# Patient Record
Sex: FEMALE | Race: WHITE | NOT HISPANIC OR LATINO
[De-identification: names, ages, dates, MRNs, and addresses within clinical notes are randomized per-mention and may not be internally consistent; named-entity substitution may affect disease eponyms.]

---

## 2017-01-05 ENCOUNTER — APPOINTMENT (OUTPATIENT)
Dept: INTERNAL MEDICINE | Facility: CLINIC | Age: 52
End: 2017-01-05

## 2017-01-05 VITALS
HEART RATE: 57 BPM | HEIGHT: 67 IN | TEMPERATURE: 98.2 F | DIASTOLIC BLOOD PRESSURE: 70 MMHG | OXYGEN SATURATION: 98 % | WEIGHT: 156 LBS | RESPIRATION RATE: 14 BRPM | SYSTOLIC BLOOD PRESSURE: 110 MMHG | BODY MASS INDEX: 24.48 KG/M2

## 2017-01-08 ENCOUNTER — RX RENEWAL (OUTPATIENT)
Age: 52
End: 2017-01-08

## 2017-02-01 ENCOUNTER — RX RENEWAL (OUTPATIENT)
Age: 52
End: 2017-02-01

## 2017-02-06 ENCOUNTER — APPOINTMENT (OUTPATIENT)
Dept: NEUROLOGY | Facility: CLINIC | Age: 52
End: 2017-02-06

## 2017-02-06 VITALS
RESPIRATION RATE: 16 BRPM | HEART RATE: 72 BPM | DIASTOLIC BLOOD PRESSURE: 70 MMHG | WEIGHT: 160 LBS | BODY MASS INDEX: 25.11 KG/M2 | HEIGHT: 67 IN | SYSTOLIC BLOOD PRESSURE: 120 MMHG

## 2017-02-06 DIAGNOSIS — Z80.51 FAMILY HISTORY OF MALIGNANT NEOPLASM OF KIDNEY: ICD-10-CM

## 2017-02-15 ENCOUNTER — APPOINTMENT (OUTPATIENT)
Dept: NEUROLOGY | Facility: CLINIC | Age: 52
End: 2017-02-15

## 2017-03-07 ENCOUNTER — RX RENEWAL (OUTPATIENT)
Age: 52
End: 2017-03-07

## 2017-03-07 ENCOUNTER — APPOINTMENT (OUTPATIENT)
Dept: NEUROLOGY | Facility: CLINIC | Age: 52
End: 2017-03-07

## 2017-03-07 VITALS
HEIGHT: 67 IN | RESPIRATION RATE: 16 BRPM | SYSTOLIC BLOOD PRESSURE: 120 MMHG | BODY MASS INDEX: 25.11 KG/M2 | HEART RATE: 70 BPM | WEIGHT: 160 LBS | DIASTOLIC BLOOD PRESSURE: 70 MMHG

## 2017-03-07 DIAGNOSIS — M54.2 CERVICALGIA: ICD-10-CM

## 2017-03-07 DIAGNOSIS — G56.03 CARPAL TUNNEL SYNDROM,BILATERAL UPPER LIMBS: ICD-10-CM

## 2017-03-07 DIAGNOSIS — M54.12 RADICULOPATHY, CERVICAL REGION: ICD-10-CM

## 2017-03-07 DIAGNOSIS — G89.29 CERVICALGIA: ICD-10-CM

## 2017-03-07 RX ORDER — CEFUROXIME AXETIL 500 MG/1
500 TABLET ORAL
Qty: 20 | Refills: 0 | Status: COMPLETED | COMMUNITY
Start: 2016-10-20

## 2017-04-06 ENCOUNTER — RX RENEWAL (OUTPATIENT)
Age: 52
End: 2017-04-06

## 2017-04-12 ENCOUNTER — APPOINTMENT (OUTPATIENT)
Dept: INTERNAL MEDICINE | Facility: CLINIC | Age: 52
End: 2017-04-12

## 2017-04-21 ENCOUNTER — APPOINTMENT (OUTPATIENT)
Dept: INTERNAL MEDICINE | Facility: CLINIC | Age: 52
End: 2017-04-21

## 2017-04-21 VITALS
SYSTOLIC BLOOD PRESSURE: 110 MMHG | HEIGHT: 67 IN | OXYGEN SATURATION: 98 % | RESPIRATION RATE: 15 BRPM | HEART RATE: 66 BPM | DIASTOLIC BLOOD PRESSURE: 70 MMHG

## 2017-04-21 DIAGNOSIS — S61.419A LACERATION W/OUT FOREIGN BODY OF UNSPECIFIED HAND, INITIAL ENCOUNTER: ICD-10-CM

## 2017-04-26 ENCOUNTER — RX RENEWAL (OUTPATIENT)
Age: 52
End: 2017-04-26

## 2017-05-08 ENCOUNTER — RX RENEWAL (OUTPATIENT)
Age: 52
End: 2017-05-08

## 2017-06-12 ENCOUNTER — RX RENEWAL (OUTPATIENT)
Age: 52
End: 2017-06-12

## 2017-07-13 ENCOUNTER — RX RENEWAL (OUTPATIENT)
Age: 52
End: 2017-07-13

## 2017-08-21 ENCOUNTER — APPOINTMENT (OUTPATIENT)
Dept: INTERNAL MEDICINE | Facility: CLINIC | Age: 52
End: 2017-08-21

## 2017-08-28 ENCOUNTER — RX RENEWAL (OUTPATIENT)
Age: 52
End: 2017-08-28

## 2017-09-12 ENCOUNTER — APPOINTMENT (OUTPATIENT)
Dept: NEUROLOGY | Facility: CLINIC | Age: 52
End: 2017-09-12

## 2017-09-14 ENCOUNTER — APPOINTMENT (OUTPATIENT)
Dept: INTERNAL MEDICINE | Facility: CLINIC | Age: 52
End: 2017-09-14
Payer: MEDICAID

## 2017-09-14 VITALS
HEIGHT: 67 IN | WEIGHT: 159 LBS | BODY MASS INDEX: 24.96 KG/M2 | RESPIRATION RATE: 14 BRPM | TEMPERATURE: 98.4 F | DIASTOLIC BLOOD PRESSURE: 80 MMHG | OXYGEN SATURATION: 98 % | HEART RATE: 67 BPM | SYSTOLIC BLOOD PRESSURE: 110 MMHG

## 2017-09-14 PROCEDURE — 99214 OFFICE O/P EST MOD 30 MIN: CPT

## 2017-09-18 ENCOUNTER — RX RENEWAL (OUTPATIENT)
Age: 52
End: 2017-09-18

## 2017-09-25 ENCOUNTER — RX RENEWAL (OUTPATIENT)
Age: 52
End: 2017-09-25

## 2017-10-02 ENCOUNTER — RX RENEWAL (OUTPATIENT)
Age: 52
End: 2017-10-02

## 2017-10-30 ENCOUNTER — APPOINTMENT (OUTPATIENT)
Dept: INTERNAL MEDICINE | Facility: CLINIC | Age: 52
End: 2017-10-30
Payer: MEDICAID

## 2017-10-30 VITALS
BODY MASS INDEX: 23.54 KG/M2 | TEMPERATURE: 97.8 F | HEIGHT: 67 IN | RESPIRATION RATE: 14 BRPM | DIASTOLIC BLOOD PRESSURE: 84 MMHG | SYSTOLIC BLOOD PRESSURE: 120 MMHG | HEART RATE: 79 BPM | WEIGHT: 150 LBS | OXYGEN SATURATION: 94 %

## 2017-10-30 DIAGNOSIS — R06.2 WHEEZING: ICD-10-CM

## 2017-10-30 PROCEDURE — 99214 OFFICE O/P EST MOD 30 MIN: CPT

## 2017-10-30 RX ORDER — METHYLPREDNISOLONE 4 MG/1
4 TABLET ORAL
Qty: 21 | Refills: 0 | Status: DISCONTINUED | COMMUNITY
Start: 2017-07-12

## 2017-10-30 RX ORDER — AMOXICILLIN 500 MG/1
500 CAPSULE ORAL 3 TIMES DAILY
Qty: 30 | Refills: 0 | Status: DISCONTINUED | COMMUNITY
Start: 2017-01-05 | End: 2017-10-30

## 2017-10-30 RX ORDER — AMOXICILLIN AND CLAVULANATE POTASSIUM 500; 125 MG/1; MG/1
500-125 TABLET, FILM COATED ORAL
Qty: 10 | Refills: 0 | Status: DISCONTINUED | COMMUNITY
Start: 2016-09-23 | End: 2017-10-30

## 2017-10-30 RX ORDER — AMOXICILLIN 500 MG/1
500 TABLET, FILM COATED ORAL 3 TIMES DAILY
Qty: 30 | Refills: 0 | Status: DISCONTINUED | COMMUNITY
Start: 2017-09-14 | End: 2017-10-30

## 2017-11-06 ENCOUNTER — RX RENEWAL (OUTPATIENT)
Age: 52
End: 2017-11-06

## 2017-12-26 ENCOUNTER — RX RENEWAL (OUTPATIENT)
Age: 52
End: 2017-12-26

## 2018-01-23 ENCOUNTER — RX RENEWAL (OUTPATIENT)
Age: 53
End: 2018-01-23

## 2018-02-21 ENCOUNTER — APPOINTMENT (OUTPATIENT)
Dept: INTERNAL MEDICINE | Facility: CLINIC | Age: 53
End: 2018-02-21

## 2018-02-26 ENCOUNTER — APPOINTMENT (OUTPATIENT)
Dept: INTERNAL MEDICINE | Facility: CLINIC | Age: 53
End: 2018-02-26
Payer: MEDICAID

## 2018-02-26 VITALS
BODY MASS INDEX: 26.06 KG/M2 | WEIGHT: 166 LBS | OXYGEN SATURATION: 98 % | HEIGHT: 67 IN | HEART RATE: 82 BPM | RESPIRATION RATE: 14 BRPM | DIASTOLIC BLOOD PRESSURE: 76 MMHG | TEMPERATURE: 98.7 F | SYSTOLIC BLOOD PRESSURE: 118 MMHG

## 2018-02-26 PROCEDURE — 99214 OFFICE O/P EST MOD 30 MIN: CPT

## 2018-02-26 RX ORDER — METHYLPREDNISOLONE 4 MG/1
4 TABLET ORAL
Qty: 1 | Refills: 0 | Status: DISCONTINUED | COMMUNITY
Start: 2017-10-30 | End: 2018-02-26

## 2018-02-26 RX ORDER — AMOXICILLIN 500 MG/1
500 CAPSULE ORAL 3 TIMES DAILY
Qty: 30 | Refills: 0 | Status: DISCONTINUED | COMMUNITY
Start: 2017-10-30 | End: 2018-02-26

## 2018-03-09 DIAGNOSIS — S99.911A UNSPECIFIED INJURY OF RIGHT ANKLE, INITIAL ENCOUNTER: ICD-10-CM

## 2018-05-01 ENCOUNTER — RX RENEWAL (OUTPATIENT)
Age: 53
End: 2018-05-01

## 2018-05-15 ENCOUNTER — RX RENEWAL (OUTPATIENT)
Age: 53
End: 2018-05-15

## 2018-05-28 ENCOUNTER — RX RENEWAL (OUTPATIENT)
Age: 53
End: 2018-05-28

## 2018-06-14 ENCOUNTER — RX RENEWAL (OUTPATIENT)
Age: 53
End: 2018-06-14

## 2018-06-29 ENCOUNTER — RX RENEWAL (OUTPATIENT)
Age: 53
End: 2018-06-29

## 2018-08-03 ENCOUNTER — APPOINTMENT (OUTPATIENT)
Dept: INTERNAL MEDICINE | Facility: CLINIC | Age: 53
End: 2018-08-03
Payer: MEDICAID

## 2018-08-03 VITALS
HEIGHT: 67 IN | SYSTOLIC BLOOD PRESSURE: 130 MMHG | HEART RATE: 67 BPM | OXYGEN SATURATION: 95 % | WEIGHT: 160 LBS | DIASTOLIC BLOOD PRESSURE: 82 MMHG | BODY MASS INDEX: 25.11 KG/M2 | RESPIRATION RATE: 14 BRPM

## 2018-08-03 DIAGNOSIS — N39.0 URINARY TRACT INFECTION, SITE NOT SPECIFIED: ICD-10-CM

## 2018-08-03 LAB
BILIRUB UR QL STRIP: NORMAL
CLARITY UR: CLEAR
GLUCOSE UR-MCNC: NORMAL
HCG UR QL: 0.2 EU/DL
HGB UR QL STRIP.AUTO: NORMAL
KETONES UR-MCNC: NORMAL
LEUKOCYTE ESTERASE UR QL STRIP: NORMAL
NITRITE UR QL STRIP: NORMAL
PH UR STRIP: 7
PROT UR STRIP-MCNC: NORMAL
SP GR UR STRIP: 1.02

## 2018-08-03 PROCEDURE — 81003 URINALYSIS AUTO W/O SCOPE: CPT | Mod: QW

## 2018-08-03 PROCEDURE — 99214 OFFICE O/P EST MOD 30 MIN: CPT

## 2018-08-03 NOTE — HISTORY OF PRESENT ILLNESS
[FreeTextEntry8] : Ill for 2 weeks has cough chest congestion head congestion no fever or chills\par also complaining of urinary frequency

## 2018-08-08 ENCOUNTER — MEDICATION RENEWAL (OUTPATIENT)
Age: 53
End: 2018-08-08

## 2018-08-08 LAB — BACTERIA UR CULT: ABNORMAL

## 2018-10-05 ENCOUNTER — APPOINTMENT (OUTPATIENT)
Dept: INTERNAL MEDICINE | Facility: CLINIC | Age: 53
End: 2018-10-05
Payer: MEDICAID

## 2018-10-05 VITALS
RESPIRATION RATE: 14 BRPM | SYSTOLIC BLOOD PRESSURE: 128 MMHG | TEMPERATURE: 98.6 F | DIASTOLIC BLOOD PRESSURE: 82 MMHG | HEART RATE: 68 BPM | OXYGEN SATURATION: 97 % | HEIGHT: 67 IN | BODY MASS INDEX: 26.21 KG/M2 | WEIGHT: 167 LBS

## 2018-10-05 DIAGNOSIS — G47.00 INSOMNIA, UNSPECIFIED: ICD-10-CM

## 2018-10-05 PROCEDURE — 99214 OFFICE O/P EST MOD 30 MIN: CPT

## 2018-10-05 RX ORDER — BENZONATATE 100 MG/1
100 CAPSULE ORAL 3 TIMES DAILY
Qty: 30 | Refills: 0 | Status: DISCONTINUED | COMMUNITY
Start: 2017-10-30 | End: 2018-10-05

## 2018-10-05 RX ORDER — AMOXICILLIN 500 MG/1
500 CAPSULE ORAL 3 TIMES DAILY
Qty: 30 | Refills: 0 | Status: DISCONTINUED | COMMUNITY
Start: 2018-02-26 | End: 2018-10-05

## 2018-10-05 RX ORDER — NITROFURANTOIN (MONOHYDRATE/MACROCRYSTALS) 25; 75 MG/1; MG/1
100 CAPSULE ORAL TWICE DAILY
Qty: 14 | Refills: 0 | Status: DISCONTINUED | COMMUNITY
Start: 2018-08-08 | End: 2018-10-05

## 2018-10-05 RX ORDER — CEPHALEXIN 500 MG/1
500 CAPSULE ORAL
Qty: 15 | Refills: 0 | Status: DISCONTINUED | COMMUNITY
Start: 2018-08-31

## 2018-10-05 RX ORDER — BENZONATATE 100 MG/1
100 CAPSULE ORAL 3 TIMES DAILY
Qty: 30 | Refills: 0 | Status: DISCONTINUED | COMMUNITY
Start: 2017-09-14 | End: 2018-10-05

## 2018-10-25 ENCOUNTER — RX RENEWAL (OUTPATIENT)
Age: 53
End: 2018-10-25

## 2018-10-29 ENCOUNTER — APPOINTMENT (OUTPATIENT)
Dept: INTERNAL MEDICINE | Facility: CLINIC | Age: 53
End: 2018-10-29

## 2018-11-26 ENCOUNTER — APPOINTMENT (OUTPATIENT)
Dept: INTERNAL MEDICINE | Facility: CLINIC | Age: 53
End: 2018-11-26

## 2019-02-24 ENCOUNTER — RX RENEWAL (OUTPATIENT)
Age: 54
End: 2019-02-24

## 2019-04-01 ENCOUNTER — RX RENEWAL (OUTPATIENT)
Age: 54
End: 2019-04-01

## 2019-04-08 ENCOUNTER — APPOINTMENT (OUTPATIENT)
Dept: INTERNAL MEDICINE | Facility: CLINIC | Age: 54
End: 2019-04-08
Payer: MEDICAID

## 2019-04-08 ENCOUNTER — LABORATORY RESULT (OUTPATIENT)
Age: 54
End: 2019-04-08

## 2019-04-08 VITALS
WEIGHT: 165 LBS | SYSTOLIC BLOOD PRESSURE: 130 MMHG | TEMPERATURE: 98.6 F | HEART RATE: 70 BPM | RESPIRATION RATE: 14 BRPM | OXYGEN SATURATION: 98 % | DIASTOLIC BLOOD PRESSURE: 80 MMHG | BODY MASS INDEX: 25.84 KG/M2

## 2019-04-08 PROCEDURE — 99214 OFFICE O/P EST MOD 30 MIN: CPT

## 2019-04-08 RX ORDER — CLOTRIMAZOLE AND BETAMETHASONE DIPROPIONATE 10; .5 MG/ML; MG/ML
1-0.05 LOTION TOPICAL
Qty: 30 | Refills: 0 | Status: DISCONTINUED | COMMUNITY
Start: 2018-09-07 | End: 2019-04-08

## 2019-04-08 RX ORDER — AMOXICILLIN 500 MG/1
500 TABLET, FILM COATED ORAL 3 TIMES DAILY
Qty: 30 | Refills: 0 | Status: DISCONTINUED | COMMUNITY
Start: 2018-10-05 | End: 2019-04-08

## 2019-04-08 RX ORDER — ALBUTEROL SULFATE 90 UG/1
108 (90 BASE) AEROSOL, METERED RESPIRATORY (INHALATION) 4 TIMES DAILY
Qty: 1 | Refills: 0 | Status: DISCONTINUED | COMMUNITY
Start: 2018-02-26 | End: 2019-04-08

## 2019-04-08 RX ORDER — METHYLPREDNISOLONE 4 MG/1
4 TABLET ORAL
Qty: 1 | Refills: 0 | Status: DISCONTINUED | COMMUNITY
Start: 2018-10-05 | End: 2019-04-08
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
## 2019-04-17 LAB
25(OH)D3 SERPL-MCNC: 12.2 NG/ML
A ALTERNATA IGE QN: <0.1 KUA/L
A FUMIGATUS IGE QN: <0.1 KUA/L
ALBUMIN SERPL ELPH-MCNC: 5 G/DL
ALP BLD-CCNC: 75 U/L
ALT SERPL-CCNC: 28 U/L
ANION GAP SERPL CALC-SCNC: 14 MMOL/L
APPEARANCE: CLEAR
AST SERPL-CCNC: 26 U/L
BASOPHILS # BLD AUTO: 0.02 K/UL
BASOPHILS NFR BLD AUTO: 0.5 %
BERMUDA GRASS IGE QN: <0.1 KUA/L
BILIRUB SERPL-MCNC: 0.3 MG/DL
BILIRUBIN URINE: NEGATIVE
BLOOD URINE: NEGATIVE
BOXELDER IGE QN: <0.1 KUA/L
BUN SERPL-MCNC: 17 MG/DL
C HERBARUM IGE QN: <0.1 KUA/L
CALCIUM SERPL-MCNC: 10.3 MG/DL
CALIF WALNUT IGE QN: <0.1 KUA/L
CAT DANDER IGE QN: <0.1 KUA/L
CHLORIDE SERPL-SCNC: 102 MMOL/L
CHOLEST SERPL-MCNC: 316 MG/DL
CHOLEST/HDLC SERPL: 7 RATIO
CMN PIGWEED IGE QN: <0.1 KUA/L
CO2 SERPL-SCNC: 26 MMOL/L
COLOR: YELLOW
COMMON RAGWEED IGE QN: <0.1 KUA/L
COTTONWOOD IGE QN: <0.1 KUA/L
CREAT SERPL-MCNC: 0.75 MG/DL
D FARINAE IGE QN: 0.11 KUA/L
D PTERONYSS IGE QN: 0.12 KUA/L
DEPRECATED A ALTERNATA IGE RAST QL: 0
DEPRECATED A FUMIGATUS IGE RAST QL: 0
DEPRECATED BERMUDA GRASS IGE RAST QL: 0
DEPRECATED BOXELDER IGE RAST QL: 0
DEPRECATED C HERBARUM IGE RAST QL: 0
DEPRECATED CAT DANDER IGE RAST QL: 0
DEPRECATED COMMON PIGWEED IGE RAST QL: 0
DEPRECATED COMMON RAGWEED IGE RAST QL: 0
DEPRECATED COTTONWOOD IGE RAST QL: 0
DEPRECATED D FARINAE IGE RAST QL: NORMAL
DEPRECATED D PTERONYSS IGE RAST QL: NORMAL
DEPRECATED DOG DANDER IGE RAST QL: 0
DEPRECATED GOOSEFOOT IGE RAST QL: 0
DEPRECATED LONDON PLANE IGE RAST QL: 0
DEPRECATED MUGWORT IGE RAST QL: 0
DEPRECATED P NOTATUM IGE RAST QL: 0
DEPRECATED RED CEDAR IGE RAST QL: 0
DEPRECATED ROACH IGE RAST QL: 0
DEPRECATED SHEEP SORREL IGE RAST QL: 0
DEPRECATED SILVER BIRCH IGE RAST QL: 0
DEPRECATED TIMOTHY IGE RAST QL: 0
DEPRECATED WHITE ASH IGE RAST QL: 0
DEPRECATED WHITE OAK IGE RAST QL: 0
DOG DANDER IGE QN: <0.1 KUA/L
EOSINOPHIL # BLD AUTO: 0.13 K/UL
EOSINOPHIL NFR BLD AUTO: 3.5 %
ESTIMATED AVERAGE GLUCOSE: 117 MG/DL
FOLATE SERPL-MCNC: 15.8 NG/ML
GLUCOSE QUALITATIVE U: NEGATIVE
GLUCOSE SERPL-MCNC: 92 MG/DL
GOOSEFOOT IGE QN: <0.1 KUA/L
HBA1C MFR BLD HPLC: 5.7 %
HCT VFR BLD CALC: 41.5 %
HDLC SERPL-MCNC: 45 MG/DL
HGB BLD-MCNC: 13.2 G/DL
IMM GRANULOCYTES NFR BLD AUTO: 0.3 %
KETONES URINE: NEGATIVE
LDLC SERPL CALC-MCNC: 226 MG/DL
LEUKOCYTE ESTERASE URINE: NEGATIVE
LONDON PLANE IGE QN: <0.1 KUA/L
LYMPHOCYTES # BLD AUTO: 0.78 K/UL
LYMPHOCYTES NFR BLD AUTO: 21.1 %
MAN DIFF?: NORMAL
MCHC RBC-ENTMCNC: 28.9 PG
MCHC RBC-ENTMCNC: 31.8 GM/DL
MCV RBC AUTO: 91 FL
MONOCYTES # BLD AUTO: 0.38 K/UL
MONOCYTES NFR BLD AUTO: 10.3 %
MUGWORT IGE QN: <0.1 KUA/L
MULBERRY (T70) CLASS: 0
MULBERRY (T70) CONC: <0.1 KUA/L
NEUTROPHILS # BLD AUTO: 2.38 K/UL
NEUTROPHILS NFR BLD AUTO: 64.3 %
NITRITE URINE: NEGATIVE
P NOTATUM IGE QN: <0.1 KUA/L
PH URINE: 8
PLATELET # BLD AUTO: 186 K/UL
POTASSIUM SERPL-SCNC: 4.4 MMOL/L
PROT SERPL-MCNC: 7.9 G/DL
PROTEIN URINE: NORMAL
RBC # BLD: 4.56 M/UL
RBC # FLD: 14 %
RED CEDAR IGE QN: <0.1 KUA/L
ROACH IGE QN: <0.1 KUA/L
SHEEP SORREL IGE QN: <0.1 KUA/L
SILVER BIRCH IGE QN: <0.1 KUA/L
SODIUM SERPL-SCNC: 142 MMOL/L
SPECIFIC GRAVITY URINE: 1.03
TIMOTHY IGE QN: <0.1 KUA/L
TREE ALLERG MIX1 IGE QL: 0
TRIGL SERPL-MCNC: 225 MG/DL
TSH SERPL-ACNC: 1.16 UIU/ML
UROBILINOGEN URINE: NORMAL
VIT B12 SERPL-MCNC: 263 PG/ML
WBC # FLD AUTO: 3.7 K/UL
WHITE ASH IGE QN: <0.1 KUA/L
WHITE ELM IGE QN: 0
WHITE ELM IGE QN: <0.1 KUA/L
WHITE OAK IGE QN: <0.1 KUA/L

## 2019-05-02 ENCOUNTER — RX RENEWAL (OUTPATIENT)
Age: 54
End: 2019-05-02

## 2019-05-07 ENCOUNTER — APPOINTMENT (OUTPATIENT)
Dept: INTERNAL MEDICINE | Facility: CLINIC | Age: 54
End: 2019-05-07
Payer: MEDICAID

## 2019-05-07 VITALS
WEIGHT: 158 LBS | TEMPERATURE: 98.2 F | HEIGHT: 67 IN | DIASTOLIC BLOOD PRESSURE: 80 MMHG | HEART RATE: 89 BPM | RESPIRATION RATE: 14 BRPM | OXYGEN SATURATION: 98 % | SYSTOLIC BLOOD PRESSURE: 110 MMHG | BODY MASS INDEX: 24.8 KG/M2

## 2019-05-07 DIAGNOSIS — Z87.09 PERSONAL HISTORY OF OTHER DISEASES OF THE RESPIRATORY SYSTEM: ICD-10-CM

## 2019-05-07 DIAGNOSIS — J06.9 ACUTE UPPER RESPIRATORY INFECTION, UNSPECIFIED: ICD-10-CM

## 2019-05-07 LAB — S PYO AG SPEC QL IA: NEGATIVE

## 2019-05-07 PROCEDURE — 36415 COLL VENOUS BLD VENIPUNCTURE: CPT

## 2019-05-07 PROCEDURE — 99214 OFFICE O/P EST MOD 30 MIN: CPT | Mod: 25

## 2019-05-07 PROCEDURE — 87880 STREP A ASSAY W/OPTIC: CPT | Mod: QW

## 2019-05-07 NOTE — PHYSICAL EXAM
[No Acute Distress] : no acute distress [Well Nourished] : well nourished [Well Developed] : well developed [Well-Appearing] : well-appearing [Normal Sclera/Conjunctiva] : normal sclera/conjunctiva [PERRL] : pupils equal round and reactive to light [EOMI] : extraocular movements intact [Normal Outer Ear/Nose] : the outer ears and nose were normal in appearance [No JVD] : no jugular venous distention [Normal Oropharynx] : the oropharynx was normal [No Lymphadenopathy] : no lymphadenopathy [Thyroid Normal, No Nodules] : the thyroid was normal and there were no nodules present [Supple] : supple [No Respiratory Distress] : no respiratory distress  [Clear to Auscultation] : lungs were clear to auscultation bilaterally [No Accessory Muscle Use] : no accessory muscle use [Regular Rhythm] : with a regular rhythm [Normal Rate] : normal rate  [No Carotid Bruits] : no carotid bruits [Normal S1, S2] : normal S1 and S2 [No Murmur] : no murmur heard [No Varicosities] : no varicosities [No Abdominal Bruit] : a ~M bruit was not heard ~T in the abdomen [No Edema] : there was no peripheral edema [No Extremity Clubbing/Cyanosis] : no extremity clubbing/cyanosis [Pedal Pulses Present] : the pedal pulses are present [No Palpable Aorta] : no palpable aorta [Soft] : abdomen soft [Non Tender] : non-tender [No HSM] : no HSM [Non-distended] : non-distended [No Masses] : no abdominal mass palpated [Normal Bowel Sounds] : normal bowel sounds [Normal Posterior Cervical Nodes] : no posterior cervical lymphadenopathy [No Spinal Tenderness] : no spinal tenderness [Normal Anterior Cervical Nodes] : no anterior cervical lymphadenopathy [No CVA Tenderness] : no CVA  tenderness [No Joint Swelling] : no joint swelling [No Rash] : no rash [Grossly Normal Strength/Tone] : grossly normal strength/tone [Normal Gait] : normal gait [Deep Tendon Reflexes (DTR)] : deep tendon reflexes were 2+ and symmetric [Coordination Grossly Intact] : coordination grossly intact [Normal Affect] : the affect was normal [No Focal Deficits] : no focal deficits [de-identified] : mild submental tenderness. No discrete nodes appreciated. [Normal Insight/Judgement] : insight and judgment were intact

## 2019-05-08 ENCOUNTER — CLINICAL ADVICE (OUTPATIENT)
Age: 54
End: 2019-05-08

## 2019-05-08 LAB
ALBUMIN SERPL ELPH-MCNC: 4.9 G/DL
ALP BLD-CCNC: 77 U/L
ALT SERPL-CCNC: 21 U/L
ANION GAP SERPL CALC-SCNC: 14 MMOL/L
AST SERPL-CCNC: 22 U/L
BASOPHILS # BLD AUTO: 0.02 K/UL
BASOPHILS NFR BLD AUTO: 0.6 %
BILIRUB SERPL-MCNC: 0.4 MG/DL
BUN SERPL-MCNC: 23 MG/DL
CALCIUM SERPL-MCNC: 10.3 MG/DL
CHLORIDE SERPL-SCNC: 103 MMOL/L
CHOLEST SERPL-MCNC: 329 MG/DL
CHOLEST/HDLC SERPL: 6.2 RATIO
CO2 SERPL-SCNC: 24 MMOL/L
CREAT SERPL-MCNC: 0.77 MG/DL
EOSINOPHIL # BLD AUTO: 0.1 K/UL
EOSINOPHIL NFR BLD AUTO: 2.9 %
GLUCOSE SERPL-MCNC: 97 MG/DL
HCT VFR BLD CALC: 42.6 %
HDLC SERPL-MCNC: 53 MG/DL
HGB BLD-MCNC: 13.5 G/DL
IMM GRANULOCYTES NFR BLD AUTO: 0.3 %
LDLC SERPL CALC-MCNC: 244 MG/DL
LYMPHOCYTES # BLD AUTO: 0.7 K/UL
LYMPHOCYTES NFR BLD AUTO: 20.3 %
MAN DIFF?: NORMAL
MCHC RBC-ENTMCNC: 28.8 PG
MCHC RBC-ENTMCNC: 31.7 GM/DL
MCV RBC AUTO: 91 FL
MONOCYTES # BLD AUTO: 0.35 K/UL
MONOCYTES NFR BLD AUTO: 10.1 %
NEUTROPHILS # BLD AUTO: 2.27 K/UL
NEUTROPHILS NFR BLD AUTO: 65.8 %
PLATELET # BLD AUTO: 190 K/UL
POTASSIUM SERPL-SCNC: 4.5 MMOL/L
PROT SERPL-MCNC: 7.7 G/DL
RBC # BLD: 4.68 M/UL
RBC # FLD: 14.3 %
SODIUM SERPL-SCNC: 141 MMOL/L
TRIGL SERPL-MCNC: 160 MG/DL
WBC # FLD AUTO: 3.45 K/UL

## 2019-05-09 LAB — BACTERIA THROAT CULT: NORMAL

## 2019-06-23 ENCOUNTER — RX RENEWAL (OUTPATIENT)
Age: 54
End: 2019-06-23

## 2019-09-05 ENCOUNTER — APPOINTMENT (OUTPATIENT)
Dept: INTERNAL MEDICINE | Facility: CLINIC | Age: 54
End: 2019-09-05
Payer: MEDICAID

## 2019-09-05 VITALS
DIASTOLIC BLOOD PRESSURE: 74 MMHG | OXYGEN SATURATION: 98 % | TEMPERATURE: 98.4 F | RESPIRATION RATE: 14 BRPM | HEART RATE: 72 BPM | SYSTOLIC BLOOD PRESSURE: 126 MMHG | HEIGHT: 67 IN

## 2019-09-05 PROCEDURE — 99214 OFFICE O/P EST MOD 30 MIN: CPT

## 2019-09-05 RX ORDER — AMOXICILLIN 500 MG/1
500 CAPSULE ORAL 3 TIMES DAILY
Qty: 21 | Refills: 0 | Status: DISCONTINUED | COMMUNITY
Start: 2019-05-07 | End: 2019-09-05

## 2019-09-20 ENCOUNTER — APPOINTMENT (OUTPATIENT)
Dept: INTERNAL MEDICINE | Facility: CLINIC | Age: 54
End: 2019-09-20
Payer: MEDICAID

## 2019-09-20 VITALS
HEART RATE: 82 BPM | SYSTOLIC BLOOD PRESSURE: 130 MMHG | RESPIRATION RATE: 14 BRPM | DIASTOLIC BLOOD PRESSURE: 82 MMHG | OXYGEN SATURATION: 96 % | BODY MASS INDEX: 24.43 KG/M2 | WEIGHT: 156 LBS | TEMPERATURE: 99.2 F

## 2019-09-20 PROCEDURE — 99214 OFFICE O/P EST MOD 30 MIN: CPT

## 2019-09-20 NOTE — PHYSICAL EXAM
[No Acute Distress] : no acute distress [Well Nourished] : well nourished [Well Developed] : well developed [Well-Appearing] : well-appearing [Normal Sclera/Conjunctiva] : normal sclera/conjunctiva [PERRL] : pupils equal round and reactive to light [EOMI] : extraocular movements intact [Normal Oropharynx] : the oropharynx was normal [Normal Outer Ear/Nose] : the outer ears and nose were normal in appearance [No JVD] : no jugular venous distention [No Lymphadenopathy] : no lymphadenopathy [Supple] : supple [Thyroid Normal, No Nodules] : the thyroid was normal and there were no nodules present [No Respiratory Distress] : no respiratory distress  [No Accessory Muscle Use] : no accessory muscle use [Clear to Auscultation] : lungs were clear to auscultation bilaterally [Normal Rate] : normal rate  [Regular Rhythm] : with a regular rhythm [Normal S1, S2] : normal S1 and S2 [No Murmur] : no murmur heard [No Carotid Bruits] : no carotid bruits [No Abdominal Bruit] : a ~M bruit was not heard ~T in the abdomen [No Edema] : there was no peripheral edema [Pedal Pulses Present] : the pedal pulses are present [No Varicosities] : no varicosities [No Extremity Clubbing/Cyanosis] : no extremity clubbing/cyanosis [No Palpable Aorta] : no palpable aorta [Soft] : abdomen soft [Non Tender] : non-tender [Non-distended] : non-distended [No Masses] : no abdominal mass palpated [No HSM] : no HSM [Normal Bowel Sounds] : normal bowel sounds [Normal Posterior Cervical Nodes] : no posterior cervical lymphadenopathy [No CVA Tenderness] : no CVA  tenderness [Normal Anterior Cervical Nodes] : no anterior cervical lymphadenopathy [No Joint Swelling] : no joint swelling [No Spinal Tenderness] : no spinal tenderness [No Rash] : no rash [Grossly Normal Strength/Tone] : grossly normal strength/tone [No Focal Deficits] : no focal deficits [Coordination Grossly Intact] : coordination grossly intact [Deep Tendon Reflexes (DTR)] : deep tendon reflexes were 2+ and symmetric [Normal Gait] : normal gait [Normal Insight/Judgement] : insight and judgment were intact [Normal Affect] : the affect was normal

## 2019-11-01 ENCOUNTER — APPOINTMENT (OUTPATIENT)
Dept: INTERNAL MEDICINE | Facility: CLINIC | Age: 54
End: 2019-11-01
Payer: MEDICAID

## 2019-11-01 VITALS
SYSTOLIC BLOOD PRESSURE: 112 MMHG | HEART RATE: 89 BPM | DIASTOLIC BLOOD PRESSURE: 70 MMHG | OXYGEN SATURATION: 98 % | BODY MASS INDEX: 25.06 KG/M2 | TEMPERATURE: 98.7 F | WEIGHT: 160 LBS | RESPIRATION RATE: 14 BRPM

## 2019-11-01 DIAGNOSIS — Z87.09 PERSONAL HISTORY OF OTHER DISEASES OF THE RESPIRATORY SYSTEM: ICD-10-CM

## 2019-11-01 DIAGNOSIS — M79.672 PAIN IN LEFT FOOT: ICD-10-CM

## 2019-11-01 PROCEDURE — 99214 OFFICE O/P EST MOD 30 MIN: CPT

## 2019-11-01 NOTE — PHYSICAL EXAM
[No Acute Distress] : no acute distress [Well Nourished] : well nourished [Well Developed] : well developed [Well-Appearing] : well-appearing [Normal Sclera/Conjunctiva] : normal sclera/conjunctiva [PERRL] : pupils equal round and reactive to light [EOMI] : extraocular movements intact [Normal Outer Ear/Nose] : the outer ears and nose were normal in appearance [Normal Oropharynx] : the oropharynx was normal [No JVD] : no jugular venous distention [No Lymphadenopathy] : no lymphadenopathy [Supple] : supple [Thyroid Normal, No Nodules] : the thyroid was normal and there were no nodules present [No Respiratory Distress] : no respiratory distress  [No Accessory Muscle Use] : no accessory muscle use [Clear to Auscultation] : lungs were clear to auscultation bilaterally [Normal Rate] : normal rate  [Regular Rhythm] : with a regular rhythm [Normal S1, S2] : normal S1 and S2 [No Murmur] : no murmur heard [No Carotid Bruits] : no carotid bruits [No Abdominal Bruit] : a ~M bruit was not heard ~T in the abdomen [No Varicosities] : no varicosities [Pedal Pulses Present] : the pedal pulses are present [No Edema] : there was no peripheral edema [No Palpable Aorta] : no palpable aorta [No Extremity Clubbing/Cyanosis] : no extremity clubbing/cyanosis [Soft] : abdomen soft [Non Tender] : non-tender [Non-distended] : non-distended [No Masses] : no abdominal mass palpated [No HSM] : no HSM [Normal Bowel Sounds] : normal bowel sounds [Normal Posterior Cervical Nodes] : no posterior cervical lymphadenopathy [Normal Anterior Cervical Nodes] : no anterior cervical lymphadenopathy [No CVA Tenderness] : no CVA  tenderness [No Spinal Tenderness] : no spinal tenderness [No Joint Swelling] : no joint swelling [Grossly Normal Strength/Tone] : grossly normal strength/tone [No Rash] : no rash [Coordination Grossly Intact] : coordination grossly intact [No Focal Deficits] : no focal deficits [Normal Gait] : normal gait [Deep Tendon Reflexes (DTR)] : deep tendon reflexes were 2+ and symmetric [Normal Affect] : the affect was normal [Normal Insight/Judgement] : insight and judgment were intact [de-identified] : bilateral maxillary sinus tenderness

## 2019-11-01 NOTE — HISTORY OF PRESENT ILLNESS
[FreeTextEntry8] : Pt stubbed her left pinky toe on 10/17/19 . The toe is  and swollen.\par Pt is c/o bilateral maxillary sinus pain for 1 week.

## 2019-12-10 ENCOUNTER — APPOINTMENT (OUTPATIENT)
Dept: INTERNAL MEDICINE | Facility: CLINIC | Age: 54
End: 2019-12-10
Payer: MEDICAID

## 2019-12-10 VITALS
WEIGHT: 165 LBS | DIASTOLIC BLOOD PRESSURE: 86 MMHG | TEMPERATURE: 98.1 F | OXYGEN SATURATION: 97 % | SYSTOLIC BLOOD PRESSURE: 130 MMHG | RESPIRATION RATE: 14 BRPM | HEART RATE: 83 BPM | BODY MASS INDEX: 25.84 KG/M2

## 2019-12-10 PROCEDURE — 99214 OFFICE O/P EST MOD 30 MIN: CPT

## 2019-12-10 RX ORDER — PREDNISONE 20 MG/1
20 TABLET ORAL
Qty: 10 | Refills: 0 | Status: DISCONTINUED | COMMUNITY
Start: 2019-09-20 | End: 2019-12-10

## 2019-12-10 RX ORDER — METHYLPREDNISOLONE 4 MG/1
4 TABLET ORAL
Qty: 1 | Refills: 0 | Status: DISCONTINUED | COMMUNITY
Start: 2019-11-01 | End: 2019-12-10

## 2019-12-10 RX ORDER — AMOXICILLIN 500 MG/1
500 TABLET, FILM COATED ORAL 3 TIMES DAILY
Qty: 30 | Refills: 0 | Status: DISCONTINUED | COMMUNITY
Start: 2019-11-01 | End: 2019-12-10

## 2019-12-10 NOTE — PHYSICAL EXAM
[No Acute Distress] : no acute distress [Well Nourished] : well nourished [Well Developed] : well developed [Normal Sclera/Conjunctiva] : normal sclera/conjunctiva [Well-Appearing] : well-appearing [PERRL] : pupils equal round and reactive to light [EOMI] : extraocular movements intact [Normal Outer Ear/Nose] : the outer ears and nose were normal in appearance [Normal Oropharynx] : the oropharynx was normal [No JVD] : no jugular venous distention [No Lymphadenopathy] : no lymphadenopathy [Supple] : supple [Thyroid Normal, No Nodules] : the thyroid was normal and there were no nodules present [No Respiratory Distress] : no respiratory distress  [No Accessory Muscle Use] : no accessory muscle use [Clear to Auscultation] : lungs were clear to auscultation bilaterally [Normal Rate] : normal rate  [No Carotid Bruits] : no carotid bruits [No Murmur] : no murmur heard [Normal S1, S2] : normal S1 and S2 [Regular Rhythm] : with a regular rhythm [Pedal Pulses Present] : the pedal pulses are present [No Abdominal Bruit] : a ~M bruit was not heard ~T in the abdomen [No Varicosities] : no varicosities [No Edema] : there was no peripheral edema [No Extremity Clubbing/Cyanosis] : no extremity clubbing/cyanosis [No Palpable Aorta] : no palpable aorta [Non Tender] : non-tender [Soft] : abdomen soft [Non-distended] : non-distended [No HSM] : no HSM [Normal Bowel Sounds] : normal bowel sounds [No Masses] : no abdominal mass palpated [Normal Posterior Cervical Nodes] : no posterior cervical lymphadenopathy [Normal Anterior Cervical Nodes] : no anterior cervical lymphadenopathy [No CVA Tenderness] : no CVA  tenderness [No Spinal Tenderness] : no spinal tenderness [Grossly Normal Strength/Tone] : grossly normal strength/tone [No Rash] : no rash [No Joint Swelling] : no joint swelling [No Focal Deficits] : no focal deficits [Coordination Grossly Intact] : coordination grossly intact [Normal Gait] : normal gait [Deep Tendon Reflexes (DTR)] : deep tendon reflexes were 2+ and symmetric [Normal Affect] : the affect was normal [Normal Insight/Judgement] : insight and judgment were intact

## 2019-12-10 NOTE — HISTORY OF PRESENT ILLNESS
[FreeTextEntry8] : Pt c/o productive  cough for 1 week, head congestion.\par Pt had temp 102F 2 days ago.

## 2020-01-06 ENCOUNTER — APPOINTMENT (OUTPATIENT)
Dept: INTERNAL MEDICINE | Facility: CLINIC | Age: 55
End: 2020-01-06
Payer: MEDICAID

## 2020-01-06 VITALS
BODY MASS INDEX: 26.31 KG/M2 | TEMPERATURE: 98.3 F | HEART RATE: 77 BPM | OXYGEN SATURATION: 98 % | SYSTOLIC BLOOD PRESSURE: 120 MMHG | WEIGHT: 168 LBS | RESPIRATION RATE: 14 BRPM | DIASTOLIC BLOOD PRESSURE: 84 MMHG

## 2020-01-06 DIAGNOSIS — Z87.09 PERSONAL HISTORY OF OTHER DISEASES OF THE RESPIRATORY SYSTEM: ICD-10-CM

## 2020-01-06 DIAGNOSIS — R50.9 FEVER, UNSPECIFIED: ICD-10-CM

## 2020-01-06 LAB
FLUAV SPEC QL CULT: NORMAL
FLUBV AG SPEC QL IA: NORMAL

## 2020-01-06 PROCEDURE — 87804 INFLUENZA ASSAY W/OPTIC: CPT | Mod: 59,QW

## 2020-01-06 PROCEDURE — 99214 OFFICE O/P EST MOD 30 MIN: CPT | Mod: 25

## 2020-01-06 NOTE — PHYSICAL EXAM
[No Acute Distress] : no acute distress [Well Nourished] : well nourished [Well Developed] : well developed [Well-Appearing] : well-appearing [Normal Sclera/Conjunctiva] : normal sclera/conjunctiva [PERRL] : pupils equal round and reactive to light [EOMI] : extraocular movements intact [Normal Outer Ear/Nose] : the outer ears and nose were normal in appearance [Normal Oropharynx] : the oropharynx was normal [No JVD] : no jugular venous distention [Supple] : supple [No Lymphadenopathy] : no lymphadenopathy [Thyroid Normal, No Nodules] : the thyroid was normal and there were no nodules present [No Respiratory Distress] : no respiratory distress  [No Accessory Muscle Use] : no accessory muscle use [Clear to Auscultation] : lungs were clear to auscultation bilaterally [Normal Rate] : normal rate  [Regular Rhythm] : with a regular rhythm [No Murmur] : no murmur heard [Normal S1, S2] : normal S1 and S2 [No Carotid Bruits] : no carotid bruits [No Abdominal Bruit] : a ~M bruit was not heard ~T in the abdomen [No Varicosities] : no varicosities [Pedal Pulses Present] : the pedal pulses are present [No Edema] : there was no peripheral edema [No Palpable Aorta] : no palpable aorta [No Extremity Clubbing/Cyanosis] : no extremity clubbing/cyanosis [Soft] : abdomen soft [Non Tender] : non-tender [Non-distended] : non-distended [No Masses] : no abdominal mass palpated [No HSM] : no HSM [Normal Bowel Sounds] : normal bowel sounds [Normal Posterior Cervical Nodes] : no posterior cervical lymphadenopathy [Normal Anterior Cervical Nodes] : no anterior cervical lymphadenopathy [No CVA Tenderness] : no CVA  tenderness [No Spinal Tenderness] : no spinal tenderness [No Joint Swelling] : no joint swelling [Grossly Normal Strength/Tone] : grossly normal strength/tone [No Rash] : no rash [Coordination Grossly Intact] : coordination grossly intact [No Focal Deficits] : no focal deficits [Normal Gait] : normal gait [Deep Tendon Reflexes (DTR)] : deep tendon reflexes were 2+ and symmetric [Normal Affect] : the affect was normal [Normal Insight/Judgement] : insight and judgment were intact

## 2020-01-11 DIAGNOSIS — R07.81 PLEURODYNIA: ICD-10-CM

## 2020-05-17 ENCOUNTER — RX RENEWAL (OUTPATIENT)
Age: 55
End: 2020-05-17

## 2020-07-28 ENCOUNTER — RX RENEWAL (OUTPATIENT)
Age: 55
End: 2020-07-28

## 2020-09-29 ENCOUNTER — RX RENEWAL (OUTPATIENT)
Age: 55
End: 2020-09-29

## 2020-09-29 RX ORDER — FLUTICASONE FUROATE AND VILANTEROL TRIFENATATE 200; 25 UG/1; UG/1
200-25 POWDER RESPIRATORY (INHALATION) DAILY
Qty: 1 | Refills: 1 | Status: ACTIVE | COMMUNITY
Start: 2020-05-17 | End: 1900-01-01

## 2020-12-08 ENCOUNTER — TRANSCRIPTION ENCOUNTER (OUTPATIENT)
Age: 55
End: 2020-12-08

## 2020-12-08 ENCOUNTER — NON-APPOINTMENT (OUTPATIENT)
Age: 55
End: 2020-12-08

## 2020-12-10 ENCOUNTER — NON-APPOINTMENT (OUTPATIENT)
Age: 55
End: 2020-12-10

## 2020-12-16 PROBLEM — N39.0 ACUTE UTI: Status: RESOLVED | Noted: 2018-08-03 | Resolved: 2020-12-16

## 2020-12-18 ENCOUNTER — APPOINTMENT (OUTPATIENT)
Dept: INTERNAL MEDICINE | Facility: CLINIC | Age: 55
End: 2020-12-18
Payer: MEDICAID

## 2020-12-18 VITALS
WEIGHT: 170 LBS | SYSTOLIC BLOOD PRESSURE: 122 MMHG | OXYGEN SATURATION: 97 % | TEMPERATURE: 97.8 F | RESPIRATION RATE: 14 BRPM | HEART RATE: 84 BPM | BODY MASS INDEX: 26.68 KG/M2 | HEIGHT: 67 IN | DIASTOLIC BLOOD PRESSURE: 76 MMHG

## 2020-12-18 DIAGNOSIS — U07.1 COVID-19: ICD-10-CM

## 2020-12-18 DIAGNOSIS — J18.9 PNEUMONIA, UNSPECIFIED ORGANISM: ICD-10-CM

## 2020-12-18 PROCEDURE — 99072 ADDL SUPL MATRL&STAF TM PHE: CPT

## 2020-12-18 PROCEDURE — 99214 OFFICE O/P EST MOD 30 MIN: CPT

## 2020-12-18 NOTE — HISTORY OF PRESENT ILLNESS
[FreeTextEntry1] : Pt went to [facility] on 12/10/20. pt was diagnosed with pneumonia and COVID-19. She was prescribed doxycycline 100mg BID for 5 days.

## 2020-12-18 NOTE — PLAN
[FreeTextEntry1] : Extend doxycycline 100 mg BID for additional 7 days.\par Isolate at least 10 days totoal. Repeat COVID-19 test at end of isolation at pt's request

## 2020-12-21 PROBLEM — J06.9 ACUTE URI: Status: RESOLVED | Noted: 2018-08-03 | Resolved: 2020-12-21

## 2020-12-21 PROBLEM — Z87.09 HISTORY OF SORE THROAT: Status: RESOLVED | Noted: 2019-05-07 | Resolved: 2020-12-21

## 2021-01-11 ENCOUNTER — APPOINTMENT (OUTPATIENT)
Dept: NEUROLOGY | Facility: CLINIC | Age: 56
End: 2021-01-11

## 2021-02-08 ENCOUNTER — TRANSCRIPTION ENCOUNTER (OUTPATIENT)
Age: 56
End: 2021-02-08

## 2021-02-08 ENCOUNTER — APPOINTMENT (OUTPATIENT)
Dept: INTERNAL MEDICINE | Facility: CLINIC | Age: 56
End: 2021-02-08
Payer: MEDICAID

## 2021-02-08 VITALS
SYSTOLIC BLOOD PRESSURE: 126 MMHG | DIASTOLIC BLOOD PRESSURE: 80 MMHG | WEIGHT: 161 LBS | BODY MASS INDEX: 25.22 KG/M2 | HEART RATE: 86 BPM | TEMPERATURE: 97.6 F | OXYGEN SATURATION: 98 % | RESPIRATION RATE: 14 BRPM

## 2021-02-08 PROCEDURE — 99072 ADDL SUPL MATRL&STAF TM PHE: CPT

## 2021-02-08 PROCEDURE — 99214 OFFICE O/P EST MOD 30 MIN: CPT

## 2021-02-08 RX ORDER — BENZONATATE 200 MG/1
200 CAPSULE ORAL 3 TIMES DAILY
Qty: 30 | Refills: 0 | Status: DISCONTINUED | COMMUNITY
Start: 2019-12-10 | End: 2021-02-08

## 2021-02-08 RX ORDER — AMOXICILLIN 500 MG/1
500 TABLET, FILM COATED ORAL 3 TIMES DAILY
Qty: 30 | Refills: 0 | Status: DISCONTINUED | COMMUNITY
Start: 2019-09-05 | End: 2021-02-08

## 2021-02-08 RX ORDER — DOXYCYCLINE HYCLATE 100 MG/1
100 CAPSULE ORAL
Qty: 14 | Refills: 0 | Status: DISCONTINUED | COMMUNITY
Start: 2020-12-18 | End: 2021-02-08

## 2021-02-08 NOTE — HISTORY OF PRESENT ILLNESS
[FreeTextEntry8] : Pt c/o bilateral maxillary sinus pain and left ear pain for 4 days.\par Pt would like a letter of support for her service dog for PTSD.

## 2021-02-08 NOTE — PHYSICAL EXAM
[No Acute Distress] : no acute distress [Well Nourished] : well nourished [Well Developed] : well developed [Well-Appearing] : well-appearing [Normal Sclera/Conjunctiva] : normal sclera/conjunctiva [PERRL] : pupils equal round and reactive to light [EOMI] : extraocular movements intact [Normal Outer Ear/Nose] : the outer ears and nose were normal in appearance [Normal Oropharynx] : the oropharynx was normal [No JVD] : no jugular venous distention [No Lymphadenopathy] : no lymphadenopathy [Supple] : supple [Thyroid Normal, No Nodules] : the thyroid was normal and there were no nodules present [No Respiratory Distress] : no respiratory distress  [No Accessory Muscle Use] : no accessory muscle use [Clear to Auscultation] : lungs were clear to auscultation bilaterally [Normal Rate] : normal rate  [Regular Rhythm] : with a regular rhythm [Normal S1, S2] : normal S1 and S2 [No Murmur] : no murmur heard [No Carotid Bruits] : no carotid bruits [No Abdominal Bruit] : a ~M bruit was not heard ~T in the abdomen [No Varicosities] : no varicosities [Pedal Pulses Present] : the pedal pulses are present [No Edema] : there was no peripheral edema [No Palpable Aorta] : no palpable aorta [No Extremity Clubbing/Cyanosis] : no extremity clubbing/cyanosis [Soft] : abdomen soft [Non-distended] : non-distended [Non Tender] : non-tender [No Masses] : no abdominal mass palpated [No HSM] : no HSM [Normal Bowel Sounds] : normal bowel sounds [Normal Posterior Cervical Nodes] : no posterior cervical lymphadenopathy [Normal Anterior Cervical Nodes] : no anterior cervical lymphadenopathy [No CVA Tenderness] : no CVA  tenderness [No Spinal Tenderness] : no spinal tenderness [Grossly Normal Strength/Tone] : grossly normal strength/tone [No Joint Swelling] : no joint swelling [No Rash] : no rash [Coordination Grossly Intact] : coordination grossly intact [No Focal Deficits] : no focal deficits [Normal Gait] : normal gait [Deep Tendon Reflexes (DTR)] : deep tendon reflexes were 2+ and symmetric [Normal Affect] : the affect was normal [Normal Insight/Judgement] : insight and judgment were intact [de-identified] : bilateral sinus pain, left worse than right

## 2021-02-08 NOTE — PLAN
[FreeTextEntry1] : Add Flonase 2 sprays per nostril QD\par Wrote support letter for service dog to help with PTSD

## 2021-03-05 ENCOUNTER — APPOINTMENT (OUTPATIENT)
Dept: INTERNAL MEDICINE | Facility: CLINIC | Age: 56
End: 2021-03-05
Payer: MEDICAID

## 2021-03-05 ENCOUNTER — LABORATORY RESULT (OUTPATIENT)
Age: 56
End: 2021-03-05

## 2021-03-05 VITALS
SYSTOLIC BLOOD PRESSURE: 138 MMHG | HEART RATE: 71 BPM | TEMPERATURE: 97.5 F | DIASTOLIC BLOOD PRESSURE: 90 MMHG | BODY MASS INDEX: 25.22 KG/M2 | RESPIRATION RATE: 14 BRPM | OXYGEN SATURATION: 97 % | WEIGHT: 161 LBS

## 2021-03-05 DIAGNOSIS — J45.40 MODERATE PERSISTENT ASTHMA, UNCOMPLICATED: ICD-10-CM

## 2021-03-05 DIAGNOSIS — R30.0 DYSURIA: ICD-10-CM

## 2021-03-05 PROCEDURE — 99214 OFFICE O/P EST MOD 30 MIN: CPT | Mod: 25

## 2021-03-05 PROCEDURE — 99072 ADDL SUPL MATRL&STAF TM PHE: CPT

## 2021-03-05 PROCEDURE — 81003 URINALYSIS AUTO W/O SCOPE: CPT | Mod: QW

## 2021-03-05 NOTE — HISTORY OF PRESENT ILLNESS
[FreeTextEntry8] : Pt saw a Urologist regarding increased urinary frequency. CT Abdomen/Pelvis and KUB X-ray were unrevealing.\par Pt was prescribed an antibiotic which she took for only 2 days 1 week ago.\par Pt is fasting for general labs today.

## 2021-03-08 LAB
25(OH)D3 SERPL-MCNC: 32.7 NG/ML
ALBUMIN SERPL ELPH-MCNC: 4.8 G/DL
ALP BLD-CCNC: 94 U/L
ALT SERPL-CCNC: 50 U/L
ANION GAP SERPL CALC-SCNC: 13 MMOL/L
APPEARANCE: ABNORMAL
AST SERPL-CCNC: 43 U/L
BACTERIA UR CULT: NORMAL
BASOPHILS # BLD AUTO: 0.03 K/UL
BASOPHILS NFR BLD AUTO: 0.7 %
BILIRUB SERPL-MCNC: 0.5 MG/DL
BILIRUB UR QL STRIP: NORMAL
BILIRUBIN URINE: NEGATIVE
BLOOD URINE: NEGATIVE
BUN SERPL-MCNC: 20 MG/DL
CALCIUM SERPL-MCNC: 10.4 MG/DL
CHLORIDE SERPL-SCNC: 102 MMOL/L
CHOLEST SERPL-MCNC: 314 MG/DL
CLARITY UR: CLEAR
CO2 SERPL-SCNC: 25 MMOL/L
COLLECTION METHOD: NORMAL
COLOR: ABNORMAL
CREAT SERPL-MCNC: 0.82 MG/DL
EOSINOPHIL # BLD AUTO: 0.15 K/UL
EOSINOPHIL NFR BLD AUTO: 3.6 %
ESTIMATED AVERAGE GLUCOSE: 114 MG/DL
FOLATE SERPL-MCNC: >20 NG/ML
GLUCOSE QUALITATIVE U: NEGATIVE
GLUCOSE SERPL-MCNC: 87 MG/DL
GLUCOSE UR-MCNC: NORMAL
HBA1C MFR BLD HPLC: 5.6 %
HCG UR QL: 0.2 EU/DL
HCT VFR BLD CALC: 43.4 %
HDLC SERPL-MCNC: 48 MG/DL
HGB BLD-MCNC: 14.4 G/DL
HGB UR QL STRIP.AUTO: NORMAL
IMM GRANULOCYTES NFR BLD AUTO: 0.5 %
KETONES UR-MCNC: NORMAL
KETONES URINE: NEGATIVE
LDLC SERPL CALC-MCNC: 215 MG/DL
LEUKOCYTE ESTERASE UR QL STRIP: NORMAL
LEUKOCYTE ESTERASE URINE: NEGATIVE
LYMPHOCYTES # BLD AUTO: 0.93 K/UL
LYMPHOCYTES NFR BLD AUTO: 22.1 %
MAN DIFF?: NORMAL
MCHC RBC-ENTMCNC: 29.6 PG
MCHC RBC-ENTMCNC: 33.2 GM/DL
MCV RBC AUTO: 89.1 FL
MONOCYTES # BLD AUTO: 0.48 K/UL
MONOCYTES NFR BLD AUTO: 11.4 %
NEUTROPHILS # BLD AUTO: 2.59 K/UL
NEUTROPHILS NFR BLD AUTO: 61.7 %
NITRITE UR QL STRIP: NORMAL
NITRITE URINE: NEGATIVE
NONHDLC SERPL-MCNC: 266 MG/DL
PH UR STRIP: 5.5
PH URINE: 5.5
PLATELET # BLD AUTO: 233 K/UL
POTASSIUM SERPL-SCNC: 4.3 MMOL/L
PROT SERPL-MCNC: 8.5 G/DL
PROT UR STRIP-MCNC: NORMAL
PROTEIN URINE: NORMAL
RBC # BLD: 4.87 M/UL
RBC # FLD: 14.4 %
SODIUM SERPL-SCNC: 140 MMOL/L
SP GR UR STRIP: 1.03
SPECIFIC GRAVITY URINE: 1.02
TRIGL SERPL-MCNC: 251 MG/DL
TSH SERPL-ACNC: 1.13 UIU/ML
UROBILINOGEN URINE: NORMAL
VIT B12 SERPL-MCNC: 314 PG/ML
WBC # FLD AUTO: 4.2 K/UL

## 2021-03-15 DIAGNOSIS — B00.1 HERPESVIRAL VESICULAR DERMATITIS: ICD-10-CM

## 2021-03-19 ENCOUNTER — NON-APPOINTMENT (OUTPATIENT)
Age: 56
End: 2021-03-19

## 2021-04-10 ENCOUNTER — RX RENEWAL (OUTPATIENT)
Age: 56
End: 2021-04-10

## 2021-04-15 ENCOUNTER — APPOINTMENT (OUTPATIENT)
Dept: INTERNAL MEDICINE | Facility: CLINIC | Age: 56
End: 2021-04-15
Payer: MEDICAID

## 2021-04-15 VITALS
HEIGHT: 67 IN | SYSTOLIC BLOOD PRESSURE: 120 MMHG | WEIGHT: 152 LBS | HEART RATE: 78 BPM | DIASTOLIC BLOOD PRESSURE: 70 MMHG | OXYGEN SATURATION: 97 % | TEMPERATURE: 97 F | RESPIRATION RATE: 14 BRPM | BODY MASS INDEX: 23.86 KG/M2

## 2021-04-15 PROCEDURE — 99072 ADDL SUPL MATRL&STAF TM PHE: CPT

## 2021-04-15 PROCEDURE — 99214 OFFICE O/P EST MOD 30 MIN: CPT

## 2021-04-15 RX ORDER — AMOXICILLIN 875 MG/1
875 TABLET, FILM COATED ORAL
Qty: 20 | Refills: 0 | Status: DISCONTINUED | COMMUNITY
Start: 2021-02-08 | End: 2021-04-15

## 2021-04-15 RX ORDER — TOBRAMYCIN 3 MG/ML
0.3 SOLUTION/ DROPS OPHTHALMIC EVERY 4 HOURS
Qty: 1 | Refills: 0 | Status: DISCONTINUED | COMMUNITY
Start: 2019-12-19 | End: 2021-04-15

## 2021-04-15 NOTE — HISTORY OF PRESENT ILLNESS
[FreeTextEntry8] : Pt is c/o bilateral maxillary sinus pain with green mucus for 3-4 days. Doesn't feel that she can attend jury duty.\par Pt reports that she has a  migraine headache every few weeks.\par Pt thinks that she has been losing hair since having COVID. Pt saw a dermatologist. She was told that it is a temporary problem

## 2021-05-18 ENCOUNTER — APPOINTMENT (OUTPATIENT)
Dept: INTERNAL MEDICINE | Facility: CLINIC | Age: 56
End: 2021-05-18
Payer: MEDICAID

## 2021-05-18 VITALS
TEMPERATURE: 97.6 F | HEART RATE: 75 BPM | BODY MASS INDEX: 24.59 KG/M2 | SYSTOLIC BLOOD PRESSURE: 122 MMHG | WEIGHT: 157 LBS | DIASTOLIC BLOOD PRESSURE: 86 MMHG | RESPIRATION RATE: 14 BRPM | OXYGEN SATURATION: 97 %

## 2021-05-18 PROCEDURE — 99214 OFFICE O/P EST MOD 30 MIN: CPT

## 2021-05-18 PROCEDURE — 99072 ADDL SUPL MATRL&STAF TM PHE: CPT

## 2021-05-18 RX ORDER — AMOXICILLIN 875 MG/1
875 TABLET, FILM COATED ORAL
Qty: 20 | Refills: 0 | Status: DISCONTINUED | COMMUNITY
Start: 2021-04-15 | End: 2021-05-18

## 2021-05-27 ENCOUNTER — RX RENEWAL (OUTPATIENT)
Age: 56
End: 2021-05-27

## 2021-06-22 ENCOUNTER — APPOINTMENT (OUTPATIENT)
Dept: INTERNAL MEDICINE | Facility: CLINIC | Age: 56
End: 2021-06-22

## 2021-06-27 ENCOUNTER — RX RENEWAL (OUTPATIENT)
Age: 56
End: 2021-06-27

## 2021-07-22 ENCOUNTER — RX RENEWAL (OUTPATIENT)
Age: 56
End: 2021-07-22

## 2021-08-20 ENCOUNTER — APPOINTMENT (OUTPATIENT)
Dept: INTERNAL MEDICINE | Facility: CLINIC | Age: 56
End: 2021-08-20
Payer: MEDICAID

## 2021-08-20 VITALS
SYSTOLIC BLOOD PRESSURE: 130 MMHG | DIASTOLIC BLOOD PRESSURE: 84 MMHG | BODY MASS INDEX: 25.69 KG/M2 | WEIGHT: 164 LBS | OXYGEN SATURATION: 98 % | TEMPERATURE: 97.5 F | HEART RATE: 76 BPM | RESPIRATION RATE: 14 BRPM

## 2021-08-20 PROCEDURE — 99214 OFFICE O/P EST MOD 30 MIN: CPT

## 2021-08-20 NOTE — PHYSICAL EXAM
[No Acute Distress] : no acute distress [Well Nourished] : well nourished [Well Developed] : well developed [Well-Appearing] : well-appearing [Normal Sclera/Conjunctiva] : normal sclera/conjunctiva [PERRL] : pupils equal round and reactive to light [EOMI] : extraocular movements intact [Normal Outer Ear/Nose] : the outer ears and nose were normal in appearance [Normal Oropharynx] : the oropharynx was normal [No JVD] : no jugular venous distention [No Lymphadenopathy] : no lymphadenopathy [Supple] : supple [Thyroid Normal, No Nodules] : the thyroid was normal and there were no nodules present [No Respiratory Distress] : no respiratory distress  [No Accessory Muscle Use] : no accessory muscle use [Clear to Auscultation] : lungs were clear to auscultation bilaterally [Normal Rate] : normal rate  [Regular Rhythm] : with a regular rhythm [Normal S1, S2] : normal S1 and S2 [No Murmur] : no murmur heard [No Carotid Bruits] : no carotid bruits [No Abdominal Bruit] : a ~M bruit was not heard ~T in the abdomen [No Varicosities] : no varicosities [Pedal Pulses Present] : the pedal pulses are present [No Edema] : there was no peripheral edema [No Palpable Aorta] : no palpable aorta [No Extremity Clubbing/Cyanosis] : no extremity clubbing/cyanosis [Soft] : abdomen soft [Non Tender] : non-tender [No Masses] : no abdominal mass palpated [Non-distended] : non-distended [No HSM] : no HSM [Normal Bowel Sounds] : normal bowel sounds [Normal Posterior Cervical Nodes] : no posterior cervical lymphadenopathy [Normal Anterior Cervical Nodes] : no anterior cervical lymphadenopathy [No CVA Tenderness] : no CVA  tenderness [No Spinal Tenderness] : no spinal tenderness [No Joint Swelling] : no joint swelling [Grossly Normal Strength/Tone] : grossly normal strength/tone [No Rash] : no rash [Coordination Grossly Intact] : coordination grossly intact [No Focal Deficits] : no focal deficits [Normal Gait] : normal gait [Deep Tendon Reflexes (DTR)] : deep tendon reflexes were 2+ and symmetric [Normal Affect] : the affect was normal [Normal Insight/Judgement] : insight and judgment were intact [de-identified] : bilateral maxillary sinus pressure

## 2021-08-24 ENCOUNTER — RX RENEWAL (OUTPATIENT)
Age: 56
End: 2021-08-24

## 2021-11-23 ENCOUNTER — APPOINTMENT (OUTPATIENT)
Dept: INTERNAL MEDICINE | Facility: CLINIC | Age: 56
End: 2021-11-23
Payer: MEDICAID

## 2021-11-23 VITALS
DIASTOLIC BLOOD PRESSURE: 70 MMHG | HEIGHT: 67 IN | OXYGEN SATURATION: 98 % | HEART RATE: 76 BPM | RESPIRATION RATE: 14 BRPM | BODY MASS INDEX: 25.9 KG/M2 | SYSTOLIC BLOOD PRESSURE: 120 MMHG | TEMPERATURE: 98 F | WEIGHT: 165 LBS

## 2021-11-23 PROCEDURE — 99214 OFFICE O/P EST MOD 30 MIN: CPT

## 2021-11-23 NOTE — HISTORY OF PRESENT ILLNESS
[FreeTextEntry8] : Pt is c/o bilateral maxillary sinus pain for 1 week . Has some discomfort in right upper teeth.\par No fever. No change of taste or smell. No dyspnea.\par Pt has received two doses of Pfizer COVID vaccine.

## 2021-12-07 ENCOUNTER — NON-APPOINTMENT (OUTPATIENT)
Age: 56
End: 2021-12-07

## 2021-12-07 ENCOUNTER — APPOINTMENT (OUTPATIENT)
Dept: INTERNAL MEDICINE | Facility: CLINIC | Age: 56
End: 2021-12-07
Payer: MEDICAID

## 2021-12-07 VITALS
RESPIRATION RATE: 14 BRPM | TEMPERATURE: 97.5 F | SYSTOLIC BLOOD PRESSURE: 126 MMHG | DIASTOLIC BLOOD PRESSURE: 70 MMHG | HEIGHT: 67 IN | BODY MASS INDEX: 25.27 KG/M2 | WEIGHT: 161 LBS | HEART RATE: 82 BPM | OXYGEN SATURATION: 99 %

## 2021-12-07 PROCEDURE — 99214 OFFICE O/P EST MOD 30 MIN: CPT | Mod: 25

## 2021-12-07 PROCEDURE — 93000 ELECTROCARDIOGRAM COMPLETE: CPT

## 2021-12-07 NOTE — HISTORY OF PRESENT ILLNESS
[FreeTextEntry1] : Here for CPE\par Overall feeling well.\par Pt has received two doses of Pfizer COVID vaccine. [de-identified] : Doesn't smoke. Social alcohol.\par Last mammogram and GYN check-up May 2021.\par Pt declines colonoscopy\par agrees to FOBI\par Exercises 6 days per week

## 2021-12-07 NOTE — ASSESSMENT
[FreeTextEntry1] : EKG Sinus bradycardia WNL\par Pt wishes to defer general labs until March\par Health status stable

## 2021-12-07 NOTE — HEALTH RISK ASSESSMENT
[Good] : ~his/her~ current health as good [Excellent] : ~his/her~  mood as  excellent [Yes] : Yes [] : No [de-identified] : social [de-identified] : Exercises 6 times per week

## 2021-12-10 LAB
M TB IFN-G BLD-IMP: NEGATIVE
QUANTIFERON TB PLUS MITOGEN MINUS NIL: 5.3 IU/ML
QUANTIFERON TB PLUS NIL: 0.02 IU/ML
QUANTIFERON TB PLUS TB1 MINUS NIL: 0 IU/ML
QUANTIFERON TB PLUS TB2 MINUS NIL: 0 IU/ML

## 2021-12-13 ENCOUNTER — APPOINTMENT (OUTPATIENT)
Dept: INTERNAL MEDICINE | Facility: CLINIC | Age: 56
End: 2021-12-13
Payer: MEDICAID

## 2021-12-13 VITALS
OXYGEN SATURATION: 98 % | RESPIRATION RATE: 14 BRPM | HEIGHT: 67 IN | DIASTOLIC BLOOD PRESSURE: 70 MMHG | TEMPERATURE: 98.3 F | BODY MASS INDEX: 24.8 KG/M2 | SYSTOLIC BLOOD PRESSURE: 120 MMHG | WEIGHT: 158 LBS | HEART RATE: 76 BPM

## 2021-12-13 DIAGNOSIS — A60.00 HERPESVIRAL INFECTION OF UROGENITAL SYSTEM, UNSPECIFIED: ICD-10-CM

## 2021-12-13 PROCEDURE — 99213 OFFICE O/P EST LOW 20 MIN: CPT

## 2021-12-13 NOTE — HISTORY OF PRESENT ILLNESS
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

## 2021-12-15 LAB
MEV IGG FLD QL IA: >300 AU/ML
MEV IGG+IGM SER-IMP: POSITIVE
MUV AB SER-ACNC: POSITIVE
MUV IGG SER QL IA: >300 AU/ML
RUBV IGG FLD-ACNC: 2.1 INDEX
RUBV IGG SER-IMP: POSITIVE
VZV AB TITR SER: POSITIVE
VZV IGG SER IF-ACNC: 848.7 INDEX

## 2021-12-16 ENCOUNTER — APPOINTMENT (OUTPATIENT)
Dept: INTERNAL MEDICINE | Facility: CLINIC | Age: 56
End: 2021-12-16
Payer: MEDICAID

## 2021-12-16 VITALS
HEIGHT: 67 IN | OXYGEN SATURATION: 98 % | BODY MASS INDEX: 24.96 KG/M2 | SYSTOLIC BLOOD PRESSURE: 114 MMHG | WEIGHT: 159 LBS | TEMPERATURE: 97.5 F | RESPIRATION RATE: 14 BRPM | DIASTOLIC BLOOD PRESSURE: 72 MMHG | HEART RATE: 62 BPM

## 2021-12-16 DIAGNOSIS — Z02.9 ENCOUNTER FOR ADMINISTRATIVE EXAMINATIONS, UNSPECIFIED: ICD-10-CM

## 2021-12-16 PROCEDURE — 99213 OFFICE O/P EST LOW 20 MIN: CPT

## 2022-01-10 ENCOUNTER — RX RENEWAL (OUTPATIENT)
Age: 57
End: 2022-01-10

## 2022-03-14 ENCOUNTER — APPOINTMENT (OUTPATIENT)
Dept: INTERNAL MEDICINE | Facility: CLINIC | Age: 57
End: 2022-03-14
Payer: MEDICAID

## 2022-03-14 VITALS
RESPIRATION RATE: 16 BRPM | WEIGHT: 150 LBS | BODY MASS INDEX: 23.54 KG/M2 | HEIGHT: 67 IN | DIASTOLIC BLOOD PRESSURE: 64 MMHG | HEART RATE: 67 BPM | TEMPERATURE: 97.2 F | SYSTOLIC BLOOD PRESSURE: 112 MMHG | OXYGEN SATURATION: 97 %

## 2022-03-14 DIAGNOSIS — R10.31 RIGHT LOWER QUADRANT PAIN: ICD-10-CM

## 2022-03-14 PROCEDURE — 99214 OFFICE O/P EST MOD 30 MIN: CPT | Mod: 25

## 2022-03-14 PROCEDURE — 81003 URINALYSIS AUTO W/O SCOPE: CPT | Mod: QW

## 2022-03-14 NOTE — HISTORY OF PRESENT ILLNESS
[FreeTextEntry8] : Pt is c/o RLQ discomfort for a few weeks. No fever. No diarrhea\par Appetite is normal.\par Pt saw GYN last week.  Had a pelvic sonogram in his office that was normal.\par Pt is also c/o sinus pressure and yellow rhinorrhea

## 2022-03-14 NOTE — PHYSICAL EXAM
[No Acute Distress] : no acute distress [Well Nourished] : well nourished [Well Developed] : well developed [Well-Appearing] : well-appearing [Normal Sclera/Conjunctiva] : normal sclera/conjunctiva [PERRL] : pupils equal round and reactive to light [EOMI] : extraocular movements intact [Normal Outer Ear/Nose] : the outer ears and nose were normal in appearance [Normal Oropharynx] : the oropharynx was normal [No JVD] : no jugular venous distention [No Lymphadenopathy] : no lymphadenopathy [Supple] : supple [Thyroid Normal, No Nodules] : the thyroid was normal and there were no nodules present [No Respiratory Distress] : no respiratory distress  [No Accessory Muscle Use] : no accessory muscle use [Clear to Auscultation] : lungs were clear to auscultation bilaterally [Normal Rate] : normal rate  [Regular Rhythm] : with a regular rhythm [Normal S1, S2] : normal S1 and S2 [No Murmur] : no murmur heard [No Carotid Bruits] : no carotid bruits [No Abdominal Bruit] : a ~M bruit was not heard ~T in the abdomen [No Varicosities] : no varicosities [Pedal Pulses Present] : the pedal pulses are present [No Edema] : there was no peripheral edema [No Palpable Aorta] : no palpable aorta [No Extremity Clubbing/Cyanosis] : no extremity clubbing/cyanosis [Soft] : abdomen soft [Non-distended] : non-distended [No Masses] : no abdominal mass palpated [No HSM] : no HSM [Normal Bowel Sounds] : normal bowel sounds [Normal Posterior Cervical Nodes] : no posterior cervical lymphadenopathy [Normal Anterior Cervical Nodes] : no anterior cervical lymphadenopathy [No CVA Tenderness] : no CVA  tenderness [No Spinal Tenderness] : no spinal tenderness [No Joint Swelling] : no joint swelling [Grossly Normal Strength/Tone] : grossly normal strength/tone [No Rash] : no rash [Coordination Grossly Intact] : coordination grossly intact [No Focal Deficits] : no focal deficits [Normal Gait] : normal gait [Deep Tendon Reflexes (DTR)] : deep tendon reflexes were 2+ and symmetric [Normal Affect] : the affect was normal [Normal Insight/Judgement] : insight and judgment were intact [de-identified] : minimal RLQ tenderness

## 2022-03-17 LAB
BACTERIA UR CULT: NORMAL
BASOPHILS # BLD AUTO: 0.03 K/UL
BASOPHILS NFR BLD AUTO: 0.6 %
BILIRUB UR QL STRIP: NEGATIVE
CLARITY UR: NORMAL
COLLECTION METHOD: NORMAL
EOSINOPHIL # BLD AUTO: 0.18 K/UL
EOSINOPHIL NFR BLD AUTO: 3.5 %
GLUCOSE UR-MCNC: NEGATIVE
HCG UR QL: 0.2 EU/DL
HCT VFR BLD CALC: 40.5 %
HGB BLD-MCNC: 13.7 G/DL
HGB UR QL STRIP.AUTO: NEGATIVE
IMM GRANULOCYTES NFR BLD AUTO: 0.2 %
KETONES UR-MCNC: NEGATIVE
LEUKOCYTE ESTERASE UR QL STRIP: NORMAL
LYMPHOCYTES # BLD AUTO: 1.03 K/UL
LYMPHOCYTES NFR BLD AUTO: 20.3 %
MAN DIFF?: NORMAL
MCHC RBC-ENTMCNC: 29.9 PG
MCHC RBC-ENTMCNC: 33.8 GM/DL
MCV RBC AUTO: 88.4 FL
MONOCYTES # BLD AUTO: 0.37 K/UL
MONOCYTES NFR BLD AUTO: 7.3 %
NEUTROPHILS # BLD AUTO: 3.46 K/UL
NEUTROPHILS NFR BLD AUTO: 68.1 %
NITRITE UR QL STRIP: NEGATIVE
PH UR STRIP: 5.5
PLATELET # BLD AUTO: 176 K/UL
PROT UR STRIP-MCNC: NEGATIVE
RBC # BLD: 4.58 M/UL
RBC # FLD: 14.4 %
SP GR UR STRIP: 1
WBC # FLD AUTO: 5.08 K/UL

## 2022-03-23 ENCOUNTER — RESULT REVIEW (OUTPATIENT)
Age: 57
End: 2022-03-23

## 2022-03-23 ENCOUNTER — APPOINTMENT (OUTPATIENT)
Dept: CT IMAGING | Facility: CLINIC | Age: 57
End: 2022-03-23
Payer: MEDICAID

## 2022-03-23 PROCEDURE — 74176 CT ABD & PELVIS W/O CONTRAST: CPT

## 2022-04-12 ENCOUNTER — APPOINTMENT (OUTPATIENT)
Dept: INTERNAL MEDICINE | Facility: CLINIC | Age: 57
End: 2022-04-12
Payer: MEDICAID

## 2022-04-12 VITALS
WEIGHT: 158 LBS | RESPIRATION RATE: 14 BRPM | DIASTOLIC BLOOD PRESSURE: 80 MMHG | HEART RATE: 68 BPM | SYSTOLIC BLOOD PRESSURE: 126 MMHG | HEIGHT: 67 IN | BODY MASS INDEX: 24.8 KG/M2 | OXYGEN SATURATION: 98 %

## 2022-04-12 PROCEDURE — 99214 OFFICE O/P EST MOD 30 MIN: CPT

## 2022-04-12 NOTE — PHYSICAL EXAM
[No Acute Distress] : no acute distress [Well Nourished] : well nourished [Well Developed] : well developed [Well-Appearing] : well-appearing [Normal Sclera/Conjunctiva] : normal sclera/conjunctiva [PERRL] : pupils equal round and reactive to light [EOMI] : extraocular movements intact [Normal Outer Ear/Nose] : the outer ears and nose were normal in appearance [Normal Oropharynx] : the oropharynx was normal [No JVD] : no jugular venous distention [No Lymphadenopathy] : no lymphadenopathy [Supple] : supple [Thyroid Normal, No Nodules] : the thyroid was normal and there were no nodules present [No Respiratory Distress] : no respiratory distress  [No Accessory Muscle Use] : no accessory muscle use [Clear to Auscultation] : lungs were clear to auscultation bilaterally [Normal Rate] : normal rate  [Regular Rhythm] : with a regular rhythm [Normal S1, S2] : normal S1 and S2 [No Murmur] : no murmur heard [No Carotid Bruits] : no carotid bruits [No Abdominal Bruit] : a ~M bruit was not heard ~T in the abdomen [No Varicosities] : no varicosities [Pedal Pulses Present] : the pedal pulses are present [No Edema] : there was no peripheral edema [No Palpable Aorta] : no palpable aorta [No Extremity Clubbing/Cyanosis] : no extremity clubbing/cyanosis [Soft] : abdomen soft [Non Tender] : non-tender [Non-distended] : non-distended [No Masses] : no abdominal mass palpated [No HSM] : no HSM [Normal Bowel Sounds] : normal bowel sounds [Normal Posterior Cervical Nodes] : no posterior cervical lymphadenopathy [Normal Anterior Cervical Nodes] : no anterior cervical lymphadenopathy [No CVA Tenderness] : no CVA  tenderness [No Spinal Tenderness] : no spinal tenderness [No Joint Swelling] : no joint swelling [Grossly Normal Strength/Tone] : grossly normal strength/tone [No Rash] : no rash [Coordination Grossly Intact] : coordination grossly intact [No Focal Deficits] : no focal deficits [Normal Gait] : normal gait [Deep Tendon Reflexes (DTR)] : deep tendon reflexes were 2+ and symmetric [Normal Affect] : the affect was normal [Normal Insight/Judgement] : insight and judgment were intact [de-identified] : bilateral maxillary sinus tenderness

## 2022-07-11 ENCOUNTER — APPOINTMENT (OUTPATIENT)
Dept: INTERNAL MEDICINE | Facility: CLINIC | Age: 57
End: 2022-07-11

## 2022-08-01 ENCOUNTER — APPOINTMENT (OUTPATIENT)
Dept: INTERNAL MEDICINE | Facility: CLINIC | Age: 57
End: 2022-08-01

## 2022-08-01 VITALS
BODY MASS INDEX: 24.64 KG/M2 | DIASTOLIC BLOOD PRESSURE: 80 MMHG | HEART RATE: 74 BPM | SYSTOLIC BLOOD PRESSURE: 138 MMHG | TEMPERATURE: 97.2 F | RESPIRATION RATE: 14 BRPM | HEIGHT: 67 IN | OXYGEN SATURATION: 98 % | WEIGHT: 157 LBS

## 2022-08-01 DIAGNOSIS — F41.9 ANXIETY DISORDER, UNSPECIFIED: ICD-10-CM

## 2022-08-01 PROCEDURE — 99213 OFFICE O/P EST LOW 20 MIN: CPT

## 2022-08-01 NOTE — HISTORY OF PRESENT ILLNESS
[FreeTextEntry8] : Pt reports that her BP was elevated at dentist last week.\par Pt was eating a good deal of slt which she has stopped

## 2022-09-08 ENCOUNTER — APPOINTMENT (OUTPATIENT)
Dept: INTERNAL MEDICINE | Facility: CLINIC | Age: 57
End: 2022-09-08

## 2022-09-08 VITALS
RESPIRATION RATE: 16 BRPM | SYSTOLIC BLOOD PRESSURE: 118 MMHG | WEIGHT: 160 LBS | TEMPERATURE: 97.9 F | OXYGEN SATURATION: 99 % | BODY MASS INDEX: 25.11 KG/M2 | HEIGHT: 67 IN | DIASTOLIC BLOOD PRESSURE: 66 MMHG | HEART RATE: 70 BPM

## 2022-09-08 DIAGNOSIS — Z91.030 BEE ALLERGY STATUS: ICD-10-CM

## 2022-09-08 PROCEDURE — 99214 OFFICE O/P EST MOD 30 MIN: CPT

## 2022-09-08 RX ORDER — EPINEPHRINE 0.3 MG/.3ML
0.3 INJECTION INTRAMUSCULAR
Qty: 1 | Refills: 0 | Status: ACTIVE | COMMUNITY
Start: 2022-09-08 | End: 1900-01-01

## 2022-09-08 NOTE — HISTORY OF PRESENT ILLNESS
[FreeTextEntry8] : Pt is c/o bilateral maxillary sinus pain and green rhinorrhea for 1 week.\par Pt had a possible allergic reaction to a wasp sting 5 days ago. She was discharged after treatment in the ER. She requests an Epipen

## 2022-11-29 ENCOUNTER — APPOINTMENT (OUTPATIENT)
Dept: INTERNAL MEDICINE | Facility: CLINIC | Age: 57
End: 2022-11-29

## 2022-12-14 ENCOUNTER — RX RENEWAL (OUTPATIENT)
Age: 57
End: 2022-12-14

## 2022-12-20 ENCOUNTER — APPOINTMENT (OUTPATIENT)
Dept: INTERNAL MEDICINE | Facility: CLINIC | Age: 57
End: 2022-12-20

## 2022-12-20 VITALS
SYSTOLIC BLOOD PRESSURE: 144 MMHG | RESPIRATION RATE: 16 BRPM | TEMPERATURE: 97.2 F | BODY MASS INDEX: 25.74 KG/M2 | OXYGEN SATURATION: 98 % | HEIGHT: 67 IN | HEART RATE: 69 BPM | WEIGHT: 164 LBS | DIASTOLIC BLOOD PRESSURE: 98 MMHG

## 2022-12-20 VITALS — SYSTOLIC BLOOD PRESSURE: 130 MMHG | DIASTOLIC BLOOD PRESSURE: 70 MMHG

## 2022-12-20 DIAGNOSIS — L72.9 FOLLICULAR CYST OF THE SKIN AND SUBCUTANEOUS TISSUE, UNSPECIFIED: ICD-10-CM

## 2022-12-20 PROCEDURE — 99214 OFFICE O/P EST MOD 30 MIN: CPT

## 2022-12-20 RX ORDER — PREDNISONE 20 MG/1
20 TABLET ORAL
Qty: 10 | Refills: 0 | Status: DISCONTINUED | COMMUNITY
Start: 2022-04-12 | End: 2022-12-20

## 2022-12-20 RX ORDER — AMOXICILLIN 500 MG/1
500 CAPSULE ORAL 3 TIMES DAILY
Qty: 30 | Refills: 0 | Status: DISCONTINUED | COMMUNITY
Start: 2021-08-20 | End: 2022-12-20

## 2022-12-20 RX ORDER — METHYLPREDNISOLONE 4 MG/1
4 TABLET ORAL
Qty: 1 | Refills: 0 | Status: DISCONTINUED | COMMUNITY
Start: 2021-05-18 | End: 2022-12-20

## 2022-12-20 RX ORDER — CEFUROXIME AXETIL 500 MG/1
500 TABLET ORAL
Qty: 20 | Refills: 0 | Status: DISCONTINUED | COMMUNITY
Start: 2022-03-14 | End: 2022-12-20

## 2022-12-20 RX ORDER — BENZONATATE 200 MG/1
200 CAPSULE ORAL 3 TIMES DAILY
Qty: 30 | Refills: 0 | Status: DISCONTINUED | COMMUNITY
Start: 2021-05-18 | End: 2022-12-20

## 2022-12-20 RX ORDER — HYDROCODONE BITARTRATE AND HOMATROPINE METHYLBROMIDE 5; 1.5 MG/5ML; MG/5ML
5-1.5 SYRUP ORAL
Qty: 100 | Refills: 0 | Status: DISCONTINUED | COMMUNITY
Start: 2019-12-18 | End: 2022-12-20

## 2022-12-20 RX ORDER — AMOXICILLIN 500 MG/1
500 CAPSULE ORAL 3 TIMES DAILY
Qty: 30 | Refills: 0 | Status: DISCONTINUED | COMMUNITY
Start: 2021-05-18 | End: 2022-12-20

## 2022-12-20 RX ORDER — CEFUROXIME AXETIL 500 MG/1
500 TABLET ORAL
Qty: 20 | Refills: 0 | Status: DISCONTINUED | COMMUNITY
Start: 2022-09-08 | End: 2022-12-20

## 2022-12-20 RX ORDER — FLUTICASONE PROPIONATE 50 UG/1
50 SPRAY, METERED NASAL DAILY
Qty: 1 | Refills: 1 | Status: DISCONTINUED | COMMUNITY
Start: 2021-04-15 | End: 2022-12-20

## 2022-12-20 NOTE — ASSESSMENT
[Patient Optimized for Surgery] : Patient optimized for surgery [No Further Testing Recommended] : no further testing recommended [FreeTextEntry4] : EKG NSR\par Presurgical labs are within acceptable limits.\par There are no medical contraindications to proceeding with the planned surgery

## 2022-12-20 NOTE — HISTORY OF PRESENT ILLNESS
[No Pertinent Cardiac History] : no history of aortic stenosis, atrial fibrillation, coronary artery disease, recent myocardial infarction, or implantable device/pacemaker [Asthma] : asthma [No Adverse Anesthesia Reaction] : no adverse anesthesia reaction in self or family member [COPD] : no COPD [Sleep Apnea] : no sleep apnea [Smoker] : not a smoker [Chronic Anticoagulation] : no chronic anticoagulation [Chronic Kidney Disease] : no chronic kidney disease [Diabetes] : no diabetes [FreeTextEntry1] : Excision of cyst behind left ear. [FreeTextEntry2] : 12/27/22 [FreeTextEntry3] : Dr. Iraheta [FreeTextEntry4] : KARTHIKEYAN MEZA,  63, is here for presurgical evaluation.\par Pt is overall feeling well.\par Pt denies chest pain, dyspnea, palpitations, lightheadedness, fever, chills, or sweats.

## 2022-12-30 ENCOUNTER — NON-APPOINTMENT (OUTPATIENT)
Age: 57
End: 2022-12-30

## 2023-01-11 ENCOUNTER — NON-APPOINTMENT (OUTPATIENT)
Age: 58
End: 2023-01-11

## 2023-03-23 ENCOUNTER — APPOINTMENT (OUTPATIENT)
Dept: INTERNAL MEDICINE | Facility: CLINIC | Age: 58
End: 2023-03-23
Payer: MEDICAID

## 2023-03-23 VITALS
BODY MASS INDEX: 25.11 KG/M2 | WEIGHT: 160 LBS | OXYGEN SATURATION: 98 % | RESPIRATION RATE: 16 BRPM | SYSTOLIC BLOOD PRESSURE: 130 MMHG | DIASTOLIC BLOOD PRESSURE: 76 MMHG | HEART RATE: 62 BPM | HEIGHT: 67 IN

## 2023-03-23 PROCEDURE — 99214 OFFICE O/P EST MOD 30 MIN: CPT

## 2023-03-23 NOTE — HISTORY OF PRESENT ILLNESS
[FreeTextEntry8] : Pt c/o bilateral sinus pain, yellow-green rhinorrhea for 1 week. No fever.\par Tested negative for covid.

## 2023-03-23 NOTE — PHYSICAL EXAM
[No Acute Distress] : no acute distress [Well Nourished] : well nourished [Well Developed] : well developed [Well-Appearing] : well-appearing [Normal Sclera/Conjunctiva] : normal sclera/conjunctiva [PERRL] : pupils equal round and reactive to light [EOMI] : extraocular movements intact [Normal Outer Ear/Nose] : the outer ears and nose were normal in appearance [Normal Oropharynx] : the oropharynx was normal [No JVD] : no jugular venous distention [No Lymphadenopathy] : no lymphadenopathy [Supple] : supple [Thyroid Normal, No Nodules] : the thyroid was normal and there were no nodules present [No Respiratory Distress] : no respiratory distress  [No Accessory Muscle Use] : no accessory muscle use [Clear to Auscultation] : lungs were clear to auscultation bilaterally [Normal Rate] : normal rate  [Regular Rhythm] : with a regular rhythm [Normal S1, S2] : normal S1 and S2 [No Murmur] : no murmur heard [No Carotid Bruits] : no carotid bruits [No Abdominal Bruit] : a ~M bruit was not heard ~T in the abdomen [No Varicosities] : no varicosities [Pedal Pulses Present] : the pedal pulses are present [No Edema] : there was no peripheral edema [No Palpable Aorta] : no palpable aorta [No Extremity Clubbing/Cyanosis] : no extremity clubbing/cyanosis [Soft] : abdomen soft [Non Tender] : non-tender [Non-distended] : non-distended [No Masses] : no abdominal mass palpated [No HSM] : no HSM [Normal Bowel Sounds] : normal bowel sounds [Normal Posterior Cervical Nodes] : no posterior cervical lymphadenopathy [Normal Anterior Cervical Nodes] : no anterior cervical lymphadenopathy [No CVA Tenderness] : no CVA  tenderness [No Spinal Tenderness] : no spinal tenderness [No Joint Swelling] : no joint swelling [Grossly Normal Strength/Tone] : grossly normal strength/tone [No Rash] : no rash [Coordination Grossly Intact] : coordination grossly intact [No Focal Deficits] : no focal deficits [Normal Gait] : normal gait [Deep Tendon Reflexes (DTR)] : deep tendon reflexes were 2+ and symmetric [Normal Affect] : the affect was normal [Normal Insight/Judgement] : insight and judgment were intact [de-identified] : bilateral maxillary sinus tenderness

## 2023-05-05 ENCOUNTER — APPOINTMENT (OUTPATIENT)
Dept: INTERNAL MEDICINE | Facility: CLINIC | Age: 58
End: 2023-05-05
Payer: MEDICAID

## 2023-05-05 VITALS
SYSTOLIC BLOOD PRESSURE: 114 MMHG | HEART RATE: 64 BPM | WEIGHT: 162 LBS | OXYGEN SATURATION: 96 % | RESPIRATION RATE: 14 BRPM | TEMPERATURE: 98.5 F | HEIGHT: 67 IN | BODY MASS INDEX: 25.43 KG/M2 | DIASTOLIC BLOOD PRESSURE: 72 MMHG

## 2023-05-05 PROCEDURE — 99214 OFFICE O/P EST MOD 30 MIN: CPT

## 2023-05-05 NOTE — HISTORY OF PRESENT ILLNESS
[FreeTextEntry8] : Pt is c/o bilateral maxillary sinus pain for 1 week.\par Would like a cough syrup.

## 2023-05-07 ENCOUNTER — NON-APPOINTMENT (OUTPATIENT)
Age: 58
End: 2023-05-07

## 2023-05-08 DIAGNOSIS — H10.30 UNSPECIFIED ACUTE CONJUNCTIVITIS, UNSPECIFIED EYE: ICD-10-CM

## 2023-06-13 ENCOUNTER — APPOINTMENT (OUTPATIENT)
Dept: INTERNAL MEDICINE | Facility: CLINIC | Age: 58
End: 2023-06-13
Payer: MEDICAID

## 2023-06-13 VITALS
RESPIRATION RATE: 14 BRPM | HEART RATE: 58 BPM | BODY MASS INDEX: 25.27 KG/M2 | WEIGHT: 161 LBS | TEMPERATURE: 98 F | DIASTOLIC BLOOD PRESSURE: 72 MMHG | HEIGHT: 67 IN | OXYGEN SATURATION: 98 % | SYSTOLIC BLOOD PRESSURE: 110 MMHG

## 2023-06-13 PROCEDURE — 99214 OFFICE O/P EST MOD 30 MIN: CPT
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
## 2023-06-20 ENCOUNTER — APPOINTMENT (OUTPATIENT)
Dept: INTERNAL MEDICINE | Facility: CLINIC | Age: 58
End: 2023-06-20
Payer: MEDICAID

## 2023-06-20 VITALS
SYSTOLIC BLOOD PRESSURE: 140 MMHG | HEART RATE: 52 BPM | DIASTOLIC BLOOD PRESSURE: 84 MMHG | RESPIRATION RATE: 16 BRPM | OXYGEN SATURATION: 99 % | TEMPERATURE: 98.6 F

## 2023-06-20 DIAGNOSIS — J20.8 ACUTE BRONCHITIS DUE TO OTHER SPECIFIED ORGANISMS: ICD-10-CM

## 2023-06-20 DIAGNOSIS — S22.20XA UNSPECIFIED FRACTURE OF STERNUM, INITIAL ENCOUNTER FOR CLOSED FRACTURE: ICD-10-CM

## 2023-06-20 DIAGNOSIS — R59.0 LOCALIZED ENLARGED LYMPH NODES: ICD-10-CM

## 2023-06-20 PROCEDURE — 99214 OFFICE O/P EST MOD 30 MIN: CPT

## 2023-06-20 NOTE — ASSESSMENT
[FreeTextEntry1] : Mediastinal adenopathy and hilar adenopathy on chest CT\par Pt denies weight loss, fever, or night sweats.

## 2023-06-20 NOTE — PLAN
[FreeTextEntry1] : PET scan \par Check labs\par See Pulmonary\par Further management pending clinical course
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
## 2023-06-21 LAB
ACE BLD-CCNC: 33 U/L
ALBUMIN SERPL ELPH-MCNC: 4.8 G/DL
ALP BLD-CCNC: 109 U/L
ALT SERPL-CCNC: 36 U/L
ANION GAP SERPL CALC-SCNC: 14 MMOL/L
AST SERPL-CCNC: 31 U/L
BILIRUB SERPL-MCNC: 0.6 MG/DL
BUN SERPL-MCNC: 16 MG/DL
CALCIUM SERPL-MCNC: 10.2 MG/DL
CHLORIDE SERPL-SCNC: 106 MMOL/L
CO2 SERPL-SCNC: 23 MMOL/L
CREAT SERPL-MCNC: 0.77 MG/DL
CRP SERPL-MCNC: <3 MG/L
EGFR: 89 ML/MIN/1.73M2
GLUCOSE SERPL-MCNC: 95 MG/DL
LDH SERPL-CCNC: 230 U/L
POTASSIUM SERPL-SCNC: 4.5 MMOL/L
PROT SERPL-MCNC: 7.7 G/DL
SODIUM SERPL-SCNC: 143 MMOL/L

## 2023-06-29 ENCOUNTER — APPOINTMENT (OUTPATIENT)
Dept: NUCLEAR MEDICINE | Facility: CLINIC | Age: 58
End: 2023-06-29

## 2023-07-13 ENCOUNTER — RX RENEWAL (OUTPATIENT)
Age: 58
End: 2023-07-13

## 2023-07-17 ENCOUNTER — APPOINTMENT (OUTPATIENT)
Dept: INTERNAL MEDICINE | Facility: CLINIC | Age: 58
End: 2023-07-17
Payer: MEDICAID

## 2023-07-17 VITALS
DIASTOLIC BLOOD PRESSURE: 78 MMHG | RESPIRATION RATE: 14 BRPM | HEART RATE: 65 BPM | OXYGEN SATURATION: 98 % | WEIGHT: 160 LBS | SYSTOLIC BLOOD PRESSURE: 130 MMHG | HEIGHT: 67 IN | BODY MASS INDEX: 25.11 KG/M2

## 2023-07-17 DIAGNOSIS — R59.0 LOCALIZED ENLARGED LYMPH NODES: ICD-10-CM

## 2023-07-17 DIAGNOSIS — Z01.818 ENCOUNTER FOR OTHER PREPROCEDURAL EXAMINATION: ICD-10-CM

## 2023-07-17 PROCEDURE — 99214 OFFICE O/P EST MOD 30 MIN: CPT

## 2023-07-17 RX ORDER — CEFUROXIME AXETIL 500 MG/1
500 TABLET ORAL
Qty: 20 | Refills: 0 | Status: DISCONTINUED | COMMUNITY
Start: 2023-06-20 | End: 2023-07-17

## 2023-07-18 NOTE — ADDENDUM
[FreeTextEntry1] : EKG NSR\par Presurgical labs are within acceptable limits\par There are no medical contraindications to proceeding with the planned surgery.\par Routine perioperative hemodynamic monitoring is recommended.

## 2023-07-18 NOTE — HISTORY OF PRESENT ILLNESS
[No Pertinent Cardiac History] : no history of aortic stenosis, atrial fibrillation, coronary artery disease, recent myocardial infarction, or implantable device/pacemaker [Asthma] : asthma [No Adverse Anesthesia Reaction] : no adverse anesthesia reaction in self or family member [(Patient denies any chest pain, claudication, dyspnea on exertion, orthopnea, palpitations or syncope)] : Patient denies any chest pain, claudication, dyspnea on exertion, orthopnea, palpitations or syncope [COPD] : no COPD [Sleep Apnea] : no sleep apnea [Smoker] : not a smoker [Chronic Anticoagulation] : no chronic anticoagulation [Chronic Kidney Disease] : no chronic kidney disease [Diabetes] : no diabetes [FreeTextEntry1] : Lymph node biopsy [FreeTextEntry2] : 7/19/23 [FreeTextEntry3] : Dr. Brandon Jonas [FreeTextEntry4] : CORDELIA MEZA,  63, is here for presurgical evaluation prior to thoracic lymph node biopsy.\par Overall feeling well.\par Pt denies chest pain, dyspnea, palpitations, lightheadedness, fever, chills, or sweats

## 2023-07-24 ENCOUNTER — NON-APPOINTMENT (OUTPATIENT)
Age: 58
End: 2023-07-24

## 2023-08-21 ENCOUNTER — NON-APPOINTMENT (OUTPATIENT)
Age: 58
End: 2023-08-21

## 2023-09-01 ENCOUNTER — RX RENEWAL (OUTPATIENT)
Age: 58
End: 2023-09-01

## 2023-10-09 ENCOUNTER — LABORATORY RESULT (OUTPATIENT)
Age: 58
End: 2023-10-09

## 2023-10-09 ENCOUNTER — APPOINTMENT (OUTPATIENT)
Dept: INTERNAL MEDICINE | Facility: CLINIC | Age: 58
End: 2023-10-09
Payer: MEDICAID

## 2023-10-09 VITALS
HEART RATE: 58 BPM | WEIGHT: 160 LBS | SYSTOLIC BLOOD PRESSURE: 126 MMHG | RESPIRATION RATE: 14 BRPM | BODY MASS INDEX: 25.11 KG/M2 | HEIGHT: 67 IN | TEMPERATURE: 98.3 F | DIASTOLIC BLOOD PRESSURE: 70 MMHG

## 2023-10-09 DIAGNOSIS — G89.4 CHRONIC PAIN SYNDROME: ICD-10-CM

## 2023-10-09 DIAGNOSIS — S29.9XXA UNSPECIFIED INJURY OF THORAX, INITIAL ENCOUNTER: ICD-10-CM

## 2023-10-09 PROCEDURE — 99214 OFFICE O/P EST MOD 30 MIN: CPT

## 2023-10-11 LAB
25(OH)D3 SERPL-MCNC: 33.7 NG/ML
ALBUMIN SERPL ELPH-MCNC: 4.8 G/DL
ALP BLD-CCNC: 83 U/L
ALT SERPL-CCNC: 33 U/L
ANION GAP SERPL CALC-SCNC: 12 MMOL/L
APPEARANCE: CLEAR
AST SERPL-CCNC: 28 U/L
BILIRUB SERPL-MCNC: 0.6 MG/DL
BILIRUBIN URINE: NEGATIVE
BLOOD URINE: NEGATIVE
BUN SERPL-MCNC: 15 MG/DL
CALCIUM SERPL-MCNC: 10.2 MG/DL
CHLORIDE SERPL-SCNC: 104 MMOL/L
CHOLEST SERPL-MCNC: 183 MG/DL
CO2 SERPL-SCNC: 25 MMOL/L
COLOR: YELLOW
CREAT SERPL-MCNC: 0.82 MG/DL
EGFR: 82 ML/MIN/1.73M2
ESTIMATED AVERAGE GLUCOSE: 111 MG/DL
FOLATE SERPL-MCNC: >20 NG/ML
GLUCOSE QUALITATIVE U: NEGATIVE MG/DL
GLUCOSE SERPL-MCNC: 89 MG/DL
HBA1C MFR BLD HPLC: 5.5 %
HCT VFR BLD CALC: 43.2 %
HDLC SERPL-MCNC: 43 MG/DL
HGB BLD-MCNC: 14.2 G/DL
KETONES URINE: NEGATIVE MG/DL
LDLC SERPL CALC-MCNC: 112 MG/DL
LEUKOCYTE ESTERASE URINE: ABNORMAL
MCHC RBC-ENTMCNC: 30.3 PG
MCHC RBC-ENTMCNC: 32.9 GM/DL
MCV RBC AUTO: 92.3 FL
NITRITE URINE: NEGATIVE
NONHDLC SERPL-MCNC: 140 MG/DL
PH URINE: 5.5
PLATELET # BLD AUTO: 189 K/UL
POTASSIUM SERPL-SCNC: 4.4 MMOL/L
PROT SERPL-MCNC: 7.4 G/DL
PROTEIN URINE: NEGATIVE MG/DL
RBC # BLD: 4.68 M/UL
RBC # FLD: 13.4 %
SODIUM SERPL-SCNC: 141 MMOL/L
SPECIFIC GRAVITY URINE: 1.02
TRIGL SERPL-MCNC: 161 MG/DL
TSH SERPL-ACNC: 1.02 UIU/ML
UROBILINOGEN URINE: 0.2 MG/DL
VIT B12 SERPL-MCNC: 651 PG/ML
WBC # FLD AUTO: 3.81 K/UL

## 2023-10-19 ENCOUNTER — APPOINTMENT (OUTPATIENT)
Dept: INTERNAL MEDICINE | Facility: CLINIC | Age: 58
End: 2023-10-19
Payer: MEDICAID

## 2023-10-19 VITALS
HEART RATE: 59 BPM | DIASTOLIC BLOOD PRESSURE: 78 MMHG | HEIGHT: 67 IN | SYSTOLIC BLOOD PRESSURE: 124 MMHG | RESPIRATION RATE: 14 BRPM | TEMPERATURE: 98.1 F | OXYGEN SATURATION: 98 % | WEIGHT: 160 LBS | BODY MASS INDEX: 25.11 KG/M2

## 2023-10-19 DIAGNOSIS — K64.9 UNSPECIFIED HEMORRHOIDS: ICD-10-CM

## 2023-10-19 DIAGNOSIS — K59.00 CONSTIPATION, UNSPECIFIED: ICD-10-CM

## 2023-10-19 LAB — HEMOCCULT STL QL IA: POSITIVE

## 2023-10-19 PROCEDURE — 99214 OFFICE O/P EST MOD 30 MIN: CPT

## 2023-10-19 RX ORDER — FLUTICASONE PROPIONATE 50 UG/1
50 SPRAY, METERED NASAL
Qty: 16 | Refills: 1 | Status: ACTIVE | COMMUNITY
Start: 2017-01-05 | End: 1900-01-01

## 2023-10-19 RX ORDER — CEFUROXIME AXETIL 500 MG/1
500 TABLET ORAL
Qty: 20 | Refills: 0 | Status: ACTIVE | COMMUNITY
Start: 2023-10-19 | End: 1900-01-01

## 2023-10-31 DIAGNOSIS — R19.4 CHANGE IN BOWEL HABIT: ICD-10-CM

## 2023-11-01 ENCOUNTER — APPOINTMENT (OUTPATIENT)
Dept: COLORECTAL SURGERY | Facility: CLINIC | Age: 58
End: 2023-11-01
Payer: MEDICAID

## 2023-11-01 DIAGNOSIS — Z12.11 ENCOUNTER FOR SCREENING FOR MALIGNANT NEOPLASM OF COLON: ICD-10-CM

## 2023-11-01 PROCEDURE — 99203 OFFICE O/P NEW LOW 30 MIN: CPT

## 2023-11-02 VITALS
HEIGHT: 67 IN | BODY MASS INDEX: 25.11 KG/M2 | SYSTOLIC BLOOD PRESSURE: 125 MMHG | WEIGHT: 160 LBS | HEART RATE: 64 BPM | RESPIRATION RATE: 14 BRPM | DIASTOLIC BLOOD PRESSURE: 82 MMHG

## 2023-12-11 DIAGNOSIS — R21 RASH AND OTHER NONSPECIFIC SKIN ERUPTION: ICD-10-CM

## 2023-12-21 ENCOUNTER — TRANSCRIPTION ENCOUNTER (OUTPATIENT)
Age: 58
End: 2023-12-21

## 2023-12-22 ENCOUNTER — APPOINTMENT (OUTPATIENT)
Dept: COLORECTAL SURGERY | Facility: CLINIC | Age: 58
End: 2023-12-22
Payer: MEDICAID

## 2023-12-22 ENCOUNTER — OUTPATIENT (OUTPATIENT)
Dept: OUTPATIENT SERVICES | Facility: HOSPITAL | Age: 58
LOS: 1 days | End: 2023-12-22
Payer: MEDICAID

## 2023-12-22 ENCOUNTER — RESULT REVIEW (OUTPATIENT)
Age: 58
End: 2023-12-22

## 2023-12-22 DIAGNOSIS — Z12.11 ENCOUNTER FOR SCREENING FOR MALIGNANT NEOPLASM OF COLON: ICD-10-CM

## 2023-12-22 DIAGNOSIS — R19.5 OTHER FECAL ABNORMALITIES: ICD-10-CM

## 2023-12-22 PROCEDURE — 45380 COLONOSCOPY AND BIOPSY: CPT

## 2023-12-22 PROCEDURE — 88305 TISSUE EXAM BY PATHOLOGIST: CPT | Mod: 26

## 2023-12-22 PROCEDURE — 88305 TISSUE EXAM BY PATHOLOGIST: CPT

## 2023-12-22 PROCEDURE — 45380 COLONOSCOPY AND BIOPSY: CPT | Mod: PT

## 2023-12-29 ENCOUNTER — TRANSCRIPTION ENCOUNTER (OUTPATIENT)
Age: 58
End: 2023-12-29

## 2023-12-29 LAB
SURGICAL PATHOLOGY STUDY: SIGNIFICANT CHANGE UP
SURGICAL PATHOLOGY STUDY: SIGNIFICANT CHANGE UP

## 2024-01-05 ENCOUNTER — APPOINTMENT (OUTPATIENT)
Dept: INTERNAL MEDICINE | Facility: CLINIC | Age: 59
End: 2024-01-05
Payer: MEDICAID

## 2024-01-05 VITALS
HEIGHT: 67 IN | TEMPERATURE: 98.7 F | SYSTOLIC BLOOD PRESSURE: 118 MMHG | RESPIRATION RATE: 14 BRPM | OXYGEN SATURATION: 98 % | DIASTOLIC BLOOD PRESSURE: 74 MMHG | WEIGHT: 160 LBS | HEART RATE: 76 BPM | BODY MASS INDEX: 25.11 KG/M2

## 2024-01-05 PROCEDURE — 99214 OFFICE O/P EST MOD 30 MIN: CPT

## 2024-01-05 PROCEDURE — G2211 COMPLEX E/M VISIT ADD ON: CPT

## 2024-01-05 RX ORDER — FLUTICASONE PROPIONATE 50 UG/1
50 SPRAY, METERED NASAL DAILY
Qty: 1 | Refills: 3 | Status: ACTIVE | COMMUNITY
Start: 2024-01-05 | End: 1900-01-01

## 2024-01-05 NOTE — PHYSICAL EXAM
[No Acute Distress] : no acute distress [Well Nourished] : well nourished [Well Developed] : well developed [Well-Appearing] : well-appearing [Normal Sclera/Conjunctiva] : normal sclera/conjunctiva [PERRL] : pupils equal round and reactive to light [EOMI] : extraocular movements intact [Normal Outer Ear/Nose] : the outer ears and nose were normal in appearance [Normal Oropharynx] : the oropharynx was normal [No JVD] : no jugular venous distention [No Lymphadenopathy] : no lymphadenopathy [Supple] : supple [Thyroid Normal, No Nodules] : the thyroid was normal and there were no nodules present [No Respiratory Distress] : no respiratory distress  [No Accessory Muscle Use] : no accessory muscle use [Clear to Auscultation] : lungs were clear to auscultation bilaterally [Normal Rate] : normal rate  [Regular Rhythm] : with a regular rhythm [Normal S1, S2] : normal S1 and S2 [No Murmur] : no murmur heard [No Carotid Bruits] : no carotid bruits [No Abdominal Bruit] : a ~M bruit was not heard ~T in the abdomen [No Varicosities] : no varicosities [Pedal Pulses Present] : the pedal pulses are present [No Edema] : there was no peripheral edema [No Palpable Aorta] : no palpable aorta [No Extremity Clubbing/Cyanosis] : no extremity clubbing/cyanosis [Soft] : abdomen soft [Non Tender] : non-tender [Non-distended] : non-distended [No Masses] : no abdominal mass palpated [No HSM] : no HSM [Normal Bowel Sounds] : normal bowel sounds [Normal Posterior Cervical Nodes] : no posterior cervical lymphadenopathy [Normal Anterior Cervical Nodes] : no anterior cervical lymphadenopathy [No CVA Tenderness] : no CVA  tenderness [No Spinal Tenderness] : no spinal tenderness [No Joint Swelling] : no joint swelling [Grossly Normal Strength/Tone] : grossly normal strength/tone [No Rash] : no rash [Coordination Grossly Intact] : coordination grossly intact [No Focal Deficits] : no focal deficits [Normal Gait] : normal gait [Deep Tendon Reflexes (DTR)] : deep tendon reflexes were 2+ and symmetric [Normal Affect] : the affect was normal [Normal Insight/Judgement] : insight and judgment were intact [de-identified] : bilateral maxillary sinus pain

## 2024-01-08 RX ORDER — PROMETHAZINE HYDROCHLORIDE 6.25 MG/5ML
6.25 SOLUTION ORAL EVERY 6 HOURS
Qty: 120 | Refills: 1 | Status: ACTIVE | COMMUNITY
Start: 2024-01-08 | End: 1900-01-01

## 2024-01-12 ENCOUNTER — APPOINTMENT (OUTPATIENT)
Dept: GASTROENTEROLOGY | Facility: CLINIC | Age: 59
End: 2024-01-12

## 2024-01-18 RX ORDER — VALACYCLOVIR 500 MG/1
500 TABLET, FILM COATED ORAL
Qty: 30 | Refills: 5 | Status: ACTIVE | COMMUNITY
Start: 2021-12-13 | End: 1900-01-01

## 2024-02-02 ENCOUNTER — RX RENEWAL (OUTPATIENT)
Age: 59
End: 2024-02-02

## 2024-02-02 RX ORDER — ATORVASTATIN CALCIUM 40 MG/1
40 TABLET, FILM COATED ORAL
Qty: 90 | Refills: 1 | Status: ACTIVE | COMMUNITY
Start: 2021-03-08 | End: 1900-01-01

## 2024-02-06 ENCOUNTER — APPOINTMENT (OUTPATIENT)
Dept: COLORECTAL SURGERY | Facility: CLINIC | Age: 59
End: 2024-02-06
Payer: MEDICAID

## 2024-02-06 ENCOUNTER — RX RENEWAL (OUTPATIENT)
Age: 59
End: 2024-02-06

## 2024-02-06 VITALS
OXYGEN SATURATION: 95 % | HEIGHT: 67 IN | BODY MASS INDEX: 25.9 KG/M2 | HEART RATE: 62 BPM | WEIGHT: 165 LBS | DIASTOLIC BLOOD PRESSURE: 81 MMHG | TEMPERATURE: 97.3 F | RESPIRATION RATE: 14 BRPM | SYSTOLIC BLOOD PRESSURE: 138 MMHG

## 2024-02-06 DIAGNOSIS — K64.8 OTHER HEMORRHOIDS: ICD-10-CM

## 2024-02-06 PROCEDURE — 46600 DIAGNOSTIC ANOSCOPY SPX: CPT

## 2024-02-06 NOTE — ASSESSMENT
[FreeTextEntry1] : The patient presents to the office for discussion of a screening colonoscopy given recent positive cologuard test. The risks/benefits/alternatives for a colonoscopy were discussed. These include a less than 1% risk of bleeding should any polyps be biopsied and/or removed. There is also a less than 0.1% risk of perforation. The patient understands the need to adhere to a clear liquid diet the day prior to procedure as well as having to perform a bowel prep in order to allow for adequate visualization of the mucosal surfaces.  Followup colonoscopies will be scheduled based on the findings that are seen at the time of the procedure. Patient understands and is agreeable, and will proceed with consent and scheduling.  2/6/24 Ms. Jesus returns to the office for follow-up and for discussion of elective hemorrhoidectomy after recently completed surveillance colonoscopy.  Anorectal exam reveals small circumferential external hemorrhoids that are not edematous and grade 2 internal hemorrhoids.  I discussed with her the risks/benefits/alternatives for hemorrhoidectomy including outpatient setting for the procedure, duration of surgery, postprocedure pain to be expected as well as the anticipated prolonged recovery.  As she is currently looking for a new position as a , we agreed that now is likely not a good time for her to have this surgery scheduled.  Once she is more stable from a work standpoint, she will consider proceeding.  All questions answered to her satisfaction.

## 2024-02-06 NOTE — PHYSICAL EXAM
[No Rash or Lesion] : No rash or lesion [Alert] : alert [Oriented to Person] : oriented to person [Oriented to Place] : oriented to place [Oriented to Time] : oriented to time [Calm] : calm [Normal rectal exam] : exam was normal [Reduce Spontaneously] : a spontaneously reducible (grade II) [Skin Tags] : residual hemorrhoidal skin tags were noted [Normal] : was normal [None] : there was no rectal mass  [Gross Blood] : no gross blood [de-identified] : Small circumferential external hemorrhoids that are not edematous [de-identified] : No apparent distress [de-identified] : Normocephalic atraumatic [de-identified] : Moving all extremities x4
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
## 2024-02-20 ENCOUNTER — NON-APPOINTMENT (OUTPATIENT)
Age: 59
End: 2024-02-20

## 2024-02-20 ENCOUNTER — APPOINTMENT (OUTPATIENT)
Dept: INTERNAL MEDICINE | Facility: CLINIC | Age: 59
End: 2024-02-20
Payer: MEDICAID

## 2024-02-20 VITALS
HEART RATE: 80 BPM | RESPIRATION RATE: 14 BRPM | DIASTOLIC BLOOD PRESSURE: 76 MMHG | OXYGEN SATURATION: 96 % | HEIGHT: 67 IN | SYSTOLIC BLOOD PRESSURE: 126 MMHG | WEIGHT: 160 LBS | BODY MASS INDEX: 25.11 KG/M2

## 2024-02-20 DIAGNOSIS — Z87.09 PERSONAL HISTORY OF OTHER DISEASES OF THE RESPIRATORY SYSTEM: ICD-10-CM

## 2024-02-20 DIAGNOSIS — E78.5 HYPERLIPIDEMIA, UNSPECIFIED: ICD-10-CM

## 2024-02-20 DIAGNOSIS — K21.9 GASTRO-ESOPHAGEAL REFLUX DISEASE W/OUT ESOPHAGITIS: ICD-10-CM

## 2024-02-20 DIAGNOSIS — Z00.00 ENCOUNTER FOR GENERAL ADULT MEDICAL EXAMINATION W/OUT ABNORMAL FINDINGS: ICD-10-CM

## 2024-02-20 PROCEDURE — 93000 ELECTROCARDIOGRAM COMPLETE: CPT

## 2024-02-20 PROCEDURE — 99396 PREV VISIT EST AGE 40-64: CPT | Mod: 25

## 2024-02-21 NOTE — HEALTH RISK ASSESSMENT
[Excellent] : ~his/her~  mood as  excellent [No] : No [Never] : Never [de-identified] : Never a smoker

## 2024-02-21 NOTE — HISTORY OF PRESENT ILLNESS
[FreeTextEntry1] : Here for CPE. Overall feeling well [de-identified] : Never a smoker. No alcohol. Last mammogram August 2023. Last GYN check-up Within past year. Last colonoscopy December 2023

## 2024-02-24 LAB
25(OH)D3 SERPL-MCNC: 34.4 NG/ML
ALBUMIN SERPL ELPH-MCNC: 4.6 G/DL
ALP BLD-CCNC: 100 U/L
ALT SERPL-CCNC: 31 U/L
ANION GAP SERPL CALC-SCNC: 12 MMOL/L
APPEARANCE: CLEAR
AST SERPL-CCNC: 26 U/L
BILIRUB SERPL-MCNC: 0.6 MG/DL
BILIRUBIN URINE: NEGATIVE
BLOOD URINE: NEGATIVE
BUN SERPL-MCNC: 27 MG/DL
CALCIUM SERPL-MCNC: 9.8 MG/DL
CHLORIDE SERPL-SCNC: 104 MMOL/L
CHOLEST SERPL-MCNC: 182 MG/DL
CO2 SERPL-SCNC: 25 MMOL/L
COLOR: YELLOW
CREAT SERPL-MCNC: 1.21 MG/DL
EGFR: 51 ML/MIN/1.73M2
ESTIMATED AVERAGE GLUCOSE: 120 MG/DL
FOLATE SERPL-MCNC: >20 NG/ML
GLUCOSE QUALITATIVE U: NEGATIVE MG/DL
GLUCOSE SERPL-MCNC: 90 MG/DL
HBA1C MFR BLD HPLC: 5.8 %
HCT VFR BLD CALC: 41.3 %
HDLC SERPL-MCNC: 47 MG/DL
HGB BLD-MCNC: 13.7 G/DL
KETONES URINE: NEGATIVE MG/DL
LDLC SERPL CALC-MCNC: 112 MG/DL
LEUKOCYTE ESTERASE URINE: NEGATIVE
M TB IFN-G BLD-IMP: NEGATIVE
MCHC RBC-ENTMCNC: 29.7 PG
MCHC RBC-ENTMCNC: 33.2 GM/DL
MCV RBC AUTO: 89.6 FL
MEV IGG FLD QL IA: >300 AU/ML
MEV IGG+IGM SER-IMP: POSITIVE
MUV AB SER-ACNC: POSITIVE
MUV IGG SER QL IA: >300 AU/ML
NITRITE URINE: NEGATIVE
NONHDLC SERPL-MCNC: 135 MG/DL
PH URINE: 7.5
PLATELET # BLD AUTO: 191 K/UL
POTASSIUM SERPL-SCNC: 4.3 MMOL/L
PROT SERPL-MCNC: 7.4 G/DL
PROTEIN URINE: NEGATIVE MG/DL
QUANTIFERON TB PLUS MITOGEN MINUS NIL: >10 IU/ML
QUANTIFERON TB PLUS NIL: 0.02 IU/ML
QUANTIFERON TB PLUS TB1 MINUS NIL: 0 IU/ML
QUANTIFERON TB PLUS TB2 MINUS NIL: 0.01 IU/ML
RBC # BLD: 4.61 M/UL
RBC # FLD: 13.4 %
RUBV IGG FLD-ACNC: 2 INDEX
RUBV IGG SER-IMP: POSITIVE
SODIUM SERPL-SCNC: 142 MMOL/L
SPECIFIC GRAVITY URINE: 1.03
TRIGL SERPL-MCNC: 127 MG/DL
TSH SERPL-ACNC: 0.95 UIU/ML
UROBILINOGEN URINE: 0.2 MG/DL
VIT B12 SERPL-MCNC: 540 PG/ML
VZV AB TITR SER: POSITIVE
VZV IGG SER IF-ACNC: 828.9 INDEX
WBC # FLD AUTO: 4.15 K/UL

## 2024-03-04 ENCOUNTER — RX RENEWAL (OUTPATIENT)
Age: 59
End: 2024-03-04

## 2024-03-22 ENCOUNTER — APPOINTMENT (OUTPATIENT)
Dept: INTERNAL MEDICINE | Facility: CLINIC | Age: 59
End: 2024-03-22

## 2024-03-22 RX ORDER — CEFUROXIME AXETIL 500 MG/1
500 TABLET ORAL
Qty: 20 | Refills: 0 | Status: ACTIVE | COMMUNITY
Start: 2024-01-05 | End: 1900-01-01

## 2024-03-29 ENCOUNTER — APPOINTMENT (OUTPATIENT)
Dept: COLORECTAL SURGERY | Facility: CLINIC | Age: 59
End: 2024-03-29
Payer: MEDICAID

## 2024-03-29 VITALS
SYSTOLIC BLOOD PRESSURE: 116 MMHG | HEART RATE: 64 BPM | HEIGHT: 67 IN | DIASTOLIC BLOOD PRESSURE: 78 MMHG | OXYGEN SATURATION: 95 % | RESPIRATION RATE: 14 BRPM | BODY MASS INDEX: 25.11 KG/M2 | WEIGHT: 160 LBS | TEMPERATURE: 97.9 F

## 2024-03-29 DIAGNOSIS — K64.5 PERIANAL VENOUS THROMBOSIS: ICD-10-CM

## 2024-03-29 DIAGNOSIS — K62.5 HEMORRHAGE OF ANUS AND RECTUM: ICD-10-CM

## 2024-03-29 PROCEDURE — 46320 REMOVAL OF HEMORRHOID CLOT: CPT

## 2024-03-29 NOTE — ASSESSMENT
[FreeTextEntry1] : The patient presents to the office for discussion of a screening colonoscopy given recent positive cologuard test. The risks/benefits/alternatives for a colonoscopy were discussed. These include a less than 1% risk of bleeding should any polyps be biopsied and/or removed. There is also a less than 0.1% risk of perforation. The patient understands the need to adhere to a clear liquid diet the day prior to procedure as well as having to perform a bowel prep in order to allow for adequate visualization of the mucosal surfaces.  Followup colonoscopies will be scheduled based on the findings that are seen at the time of the procedure. Patient understands and is agreeable, and will proceed with consent and scheduling.  2/6/24 Ms. Jesus returns to the office for follow-up and for discussion of elective hemorrhoidectomy after recently completed surveillance colonoscopy.  Anorectal exam reveals small circumferential external hemorrhoids that are not edematous and grade 2 internal hemorrhoids.  I discussed with her the risks/benefits/alternatives for hemorrhoidectomy including outpatient setting for the procedure, duration of surgery, postprocedure pain to be expected as well as the anticipated prolonged recovery.  As she is currently looking for a new position as a , we agreed that now is likely not a good time for her to have this surgery scheduled.  Once she is more stable from a work standpoint, she will consider proceeding.  All questions answered to her satisfaction.  3/29/24 Niyah returns to the office with a HUEY and ulcerating clot.   I have discussed the etiology for this condition including excess straining with activity or straining to evacuate stools secondary to constipation or diarrhea. In office today, we proceeded to excise the HUEY to good effect. The patient was advised on what to expect post procedure. This includes some mild discomfort which should be readily alleviated by over-the-counter pain medications. Sitz baths will need to be performed to keep the wound site clean to facilitate healing. There will be some mild serosanguineous drainage to be anticipated and this should resolve as the wound heals. Cotton gauze should be placed in undergarments to prevent soilage from drainage. She would like to proceed with scheduling for hemorrhoidectomy for September.

## 2024-03-29 NOTE — PHYSICAL EXAM
[Normal rectal exam] : exam was normal [Skin Tags] : residual hemorrhoidal skin tags were noted [Reduce Spontaneously] : a spontaneously reducible (grade II) [Normal] : was normal [None] : there was no rectal abscess [No Rash or Lesion] : No rash or lesion [Gross Blood] : no gross blood [Alert] : alert [Oriented to Place] : oriented to place [Oriented to Person] : oriented to person [Calm] : calm [Oriented to Time] : oriented to time [de-identified] : RP HUEY with ulcerating clot [de-identified] : No apparent distress [de-identified] : Normocephalic atraumatic [de-identified] : Moving all extremities x4
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
## 2024-04-01 ENCOUNTER — RX RENEWAL (OUTPATIENT)
Age: 59
End: 2024-04-01

## 2024-04-28 ENCOUNTER — RX RENEWAL (OUTPATIENT)
Age: 59
End: 2024-04-28

## 2024-04-28 RX ORDER — IBUPROFEN 800 MG/1
800 TABLET, FILM COATED ORAL
Qty: 90 | Refills: 0 | Status: ACTIVE | COMMUNITY
Start: 2024-01-08 | End: 1900-01-01

## 2024-05-13 ENCOUNTER — APPOINTMENT (OUTPATIENT)
Dept: INTERNAL MEDICINE | Facility: CLINIC | Age: 59
End: 2024-05-13
Payer: MEDICAID

## 2024-05-13 VITALS
OXYGEN SATURATION: 95 % | RESPIRATION RATE: 14 BRPM | SYSTOLIC BLOOD PRESSURE: 122 MMHG | DIASTOLIC BLOOD PRESSURE: 78 MMHG | TEMPERATURE: 97.9 F | WEIGHT: 165 LBS | HEIGHT: 67 IN | BODY MASS INDEX: 25.9 KG/M2 | HEART RATE: 59 BPM

## 2024-05-13 DIAGNOSIS — J01.00 ACUTE MAXILLARY SINUSITIS, UNSPECIFIED: ICD-10-CM

## 2024-05-13 DIAGNOSIS — J45.20 MILD INTERMITTENT ASTHMA, UNCOMPLICATED: ICD-10-CM

## 2024-05-13 DIAGNOSIS — R05.9 COUGH, UNSPECIFIED: ICD-10-CM

## 2024-05-13 PROCEDURE — 99213 OFFICE O/P EST LOW 20 MIN: CPT

## 2024-05-13 RX ORDER — CEFUROXIME AXETIL 500 MG/1
500 TABLET ORAL
Qty: 20 | Refills: 0 | Status: ACTIVE | COMMUNITY
Start: 2024-05-13 | End: 1900-01-01

## 2024-05-13 RX ORDER — FLUTICASONE PROPIONATE 50 UG/1
50 SPRAY, METERED NASAL DAILY
Qty: 1 | Refills: 2 | Status: ACTIVE | COMMUNITY
Start: 2024-05-13 | End: 1900-01-01

## 2024-06-04 ENCOUNTER — APPOINTMENT (OUTPATIENT)
Dept: NEUROLOGY | Facility: CLINIC | Age: 59
End: 2024-06-04
Payer: MEDICAID

## 2024-06-04 VITALS
OXYGEN SATURATION: 98 % | DIASTOLIC BLOOD PRESSURE: 83 MMHG | HEIGHT: 67 IN | HEART RATE: 71 BPM | WEIGHT: 165 LBS | SYSTOLIC BLOOD PRESSURE: 136 MMHG | BODY MASS INDEX: 25.9 KG/M2

## 2024-06-04 DIAGNOSIS — Z82.0 FAMILY HISTORY OF EPILEPSY AND OTHER DISEASES OF THE NERVOUS SYSTEM: ICD-10-CM

## 2024-06-04 DIAGNOSIS — R29.90 UNSPECIFIED SYMPTOMS AND SIGNS INVOLVING THE NERVOUS SYSTEM: ICD-10-CM

## 2024-06-04 DIAGNOSIS — F43.10 POST-TRAUMATIC STRESS DISORDER, UNSPECIFIED: ICD-10-CM

## 2024-06-04 PROCEDURE — 99205 OFFICE O/P NEW HI 60 MIN: CPT

## 2024-06-04 RX ORDER — AMOXICILLIN 500 MG/1
500 CAPSULE ORAL 3 TIMES DAILY
Qty: 21 | Refills: 0 | Status: DISCONTINUED | COMMUNITY
Start: 2024-01-18 | End: 2024-06-04

## 2024-06-04 NOTE — DATA REVIEWED
[de-identified] : CT HEAD 5/23/24 FINDINGS: Cerebral volume is within normal limits. No premature white matter disease. No acute intracranial hemorrhage. No midline shift or herniation. No CT evidence of acute territorial infarction, although MRI with DWI would be more sensitive. The visualized sinuses and mastoids are clear. Limited views of the orbits and visualized soft tissues of the neck, face, scalp, skull base, and calvarium are otherwise unremarkable. IMPRESSION: Normal.
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
## 2024-06-04 NOTE — PHYSICAL EXAM
[FreeTextEntry1] : NEURO: Mental Status Oriented to person, place, time, and situation Rapid speech, clear, fluent. Comprehension and memory intact.   Cranial Nerves Visual fields full to confrontation Pupils equal, round, reactive to light. Extraocular movements intact. No nystagmus or ptosis. Facial sensation intact and symmetric in V1, V2, V3 Facial movement intact and symmetric Uvula midline, soft palate elevates normally Bilateral shoulder shrug intact Tongue midline, no tongue bite sign or deviation on protrusion   Motor Tone and bulk intact Shoulder abduction: 5/5 b/l Elbow flexion/extension: 5/5 bl Hand : 5/5 b/l Hip flexion/extension: 5/5 b/l Knee flexion/extension: 5/5 b/l Dorsiflexion/plantar flexion: 5/5 b/l No pronator drift   Sensation Light touch grossly intact  Deep Tendon Reflexes Biceps 2+ b/l Brachioradialis 2+ b/l Patellar 2+ b/l Ankle 2+ b/l Plantar response normal b/l   Coordination Normal finger to nose bilaterally No past pointing Normal heel to shin bilaterally No tremor appreciated.   Gait Normal stance, stride, and pivot turn Tandem walk intact Able to stand on toes and heels Negative Romberg.     GEN: awake, alert, interactive, no acute distress EYES: sclera white, conjunctiva clear, no redness or discharge ENT: normal appearing outer ears, hearing grossly intact PULM: normal respiratory rhythm and effort, speaking in full sentences without distress, no accessory muscle usage EXT: moving all extremities spontaneously, no edema, no cyanosis SKIN: warm, dry, no lesions on visualized skin

## 2024-07-03 ENCOUNTER — APPOINTMENT (OUTPATIENT)
Dept: NEUROLOGY | Facility: CLINIC | Age: 59
End: 2024-07-03
Payer: MEDICAID

## 2024-07-03 VITALS
BODY MASS INDEX: 25.9 KG/M2 | HEART RATE: 62 BPM | DIASTOLIC BLOOD PRESSURE: 86 MMHG | HEIGHT: 67 IN | WEIGHT: 165 LBS | SYSTOLIC BLOOD PRESSURE: 136 MMHG | OXYGEN SATURATION: 96 %

## 2024-07-03 DIAGNOSIS — R29.90 UNSPECIFIED SYMPTOMS AND SIGNS INVOLVING THE NERVOUS SYSTEM: ICD-10-CM

## 2024-07-03 PROCEDURE — 99214 OFFICE O/P EST MOD 30 MIN: CPT

## 2024-08-01 DIAGNOSIS — J01.00 ACUTE MAXILLARY SINUSITIS, UNSPECIFIED: ICD-10-CM

## 2024-08-01 RX ORDER — PROMETHAZINE HYDROCHLORIDE AND DEXTROMETHORPHAN HYDROBROMIDE ORAL SOLUTION 15; 6.25 MG/5ML; MG/5ML
6.25-15 SOLUTION ORAL EVERY 6 HOURS
Qty: 200 | Refills: 0 | Status: ACTIVE | COMMUNITY
Start: 2024-08-01 | End: 1900-01-01

## 2024-08-03 ENCOUNTER — RX RENEWAL (OUTPATIENT)
Age: 59
End: 2024-08-03

## 2024-08-06 ENCOUNTER — APPOINTMENT (OUTPATIENT)
Dept: INTERNAL MEDICINE | Facility: CLINIC | Age: 59
End: 2024-08-06

## 2024-08-26 ENCOUNTER — NON-APPOINTMENT (OUTPATIENT)
Age: 59
End: 2024-08-26

## 2024-11-15 ENCOUNTER — APPOINTMENT (OUTPATIENT)
Dept: INTERNAL MEDICINE | Facility: CLINIC | Age: 59
End: 2024-11-15
Payer: MEDICAID

## 2024-11-15 VITALS
HEART RATE: 74 BPM | HEIGHT: 67 IN | DIASTOLIC BLOOD PRESSURE: 78 MMHG | OXYGEN SATURATION: 98 % | WEIGHT: 164 LBS | TEMPERATURE: 98.2 F | RESPIRATION RATE: 16 BRPM | BODY MASS INDEX: 25.74 KG/M2 | SYSTOLIC BLOOD PRESSURE: 132 MMHG

## 2024-11-15 DIAGNOSIS — J01.00 ACUTE MAXILLARY SINUSITIS, UNSPECIFIED: ICD-10-CM

## 2024-11-15 PROCEDURE — 99214 OFFICE O/P EST MOD 30 MIN: CPT

## 2024-11-15 RX ORDER — CEFUROXIME AXETIL 500 MG/1
500 TABLET, FILM COATED ORAL
Qty: 20 | Refills: 0 | Status: ACTIVE | COMMUNITY
Start: 2024-11-15 | End: 1900-01-01

## (undated) DEVICE — FORCEP RADIAL JAW 4 JUMBO 2.8MM 3.2MM 240CM ORANGE DISP

## (undated) DEVICE — VALVE ENDO SURESEAL II 0-5FR